# Patient Record
Sex: FEMALE | Race: WHITE | Employment: FULL TIME | URBAN - METROPOLITAN AREA
[De-identification: names, ages, dates, MRNs, and addresses within clinical notes are randomized per-mention and may not be internally consistent; named-entity substitution may affect disease eponyms.]

---

## 2017-01-01 ENCOUNTER — HOSPITAL ENCOUNTER (OUTPATIENT)
Dept: INFUSION THERAPY | Age: 57
Discharge: HOME | End: 2017-12-29
Payer: COMMERCIAL

## 2017-01-01 VITALS
SYSTOLIC BLOOD PRESSURE: 139 MMHG | DIASTOLIC BLOOD PRESSURE: 87 MMHG | TEMPERATURE: 97.8 F | HEART RATE: 79 BPM | RESPIRATION RATE: 16 BRPM

## 2017-01-01 LAB
ALBUMIN SERPL-MCNC: 3.9 G/DL (ref 3.5–5)
ALBUMIN/GLOB SERPL: 1.1 {RATIO} (ref 1.1–2.2)
ALP SERPL-CCNC: 99 U/L (ref 45–117)
ALT SERPL-CCNC: 40 U/L (ref 12–78)
ANION GAP SERPL CALC-SCNC: 7 MMOL/L (ref 5–15)
AST SERPL-CCNC: 31 U/L (ref 15–37)
BASOPHILS # BLD: 0 K/UL (ref 0–0.1)
BASOPHILS NFR BLD: 0 % (ref 0–1)
BILIRUB SERPL-MCNC: 0.3 MG/DL (ref 0.2–1)
BUN SERPL-MCNC: 14 MG/DL (ref 6–20)
BUN/CREAT SERPL: 25 (ref 12–20)
CALCIUM SERPL-MCNC: 9 MG/DL (ref 8.5–10.1)
CHLORIDE SERPL-SCNC: 104 MMOL/L (ref 97–108)
CO2 SERPL-SCNC: 29 MMOL/L (ref 21–32)
CREAT SERPL-MCNC: 0.55 MG/DL (ref 0.55–1.02)
EOSINOPHIL # BLD: 0 K/UL (ref 0–0.4)
EOSINOPHIL NFR BLD: 1 % (ref 0–7)
ERYTHROCYTE [DISTWIDTH] IN BLOOD BY AUTOMATED COUNT: 14 % (ref 11.5–14.5)
GLOBULIN SER CALC-MCNC: 3.5 G/DL (ref 2–4)
GLUCOSE SERPL-MCNC: 82 MG/DL (ref 65–100)
HCT VFR BLD AUTO: 37.7 % (ref 35–47)
HGB BLD-MCNC: 12.4 G/DL (ref 11.5–16)
LYMPHOCYTES # BLD: 0.8 K/UL (ref 0.8–3.5)
LYMPHOCYTES NFR BLD: 22 % (ref 12–49)
MCH RBC QN AUTO: 29.9 PG (ref 26–34)
MCHC RBC AUTO-ENTMCNC: 32.9 G/DL (ref 30–36.5)
MCV RBC AUTO: 90.8 FL (ref 80–99)
MONOCYTES # BLD: 0.5 K/UL (ref 0–1)
MONOCYTES NFR BLD: 14 % (ref 5–13)
NEUTS SEG # BLD: 2.4 K/UL (ref 1.8–8)
NEUTS SEG NFR BLD: 63 % (ref 32–75)
PLATELET # BLD AUTO: 135 K/UL (ref 150–400)
POTASSIUM SERPL-SCNC: 4.2 MMOL/L (ref 3.5–5.1)
PROT SERPL-MCNC: 7.4 G/DL (ref 6.4–8.2)
RBC # BLD AUTO: 4.15 M/UL (ref 3.8–5.2)
SODIUM SERPL-SCNC: 140 MMOL/L (ref 136–145)
WBC # BLD AUTO: 3.8 K/UL (ref 3.6–11)

## 2017-01-01 PROCEDURE — 96401 CHEMO ANTI-NEOPL SQ/IM: CPT

## 2017-01-01 PROCEDURE — 36415 COLL VENOUS BLD VENIPUNCTURE: CPT | Performed by: NURSE PRACTITIONER

## 2017-01-01 PROCEDURE — 85025 COMPLETE CBC W/AUTO DIFF WBC: CPT | Performed by: NURSE PRACTITIONER

## 2017-01-01 PROCEDURE — 80053 COMPREHEN METABOLIC PANEL: CPT | Performed by: NURSE PRACTITIONER

## 2017-01-01 PROCEDURE — 74011250636 HC RX REV CODE- 250/636: Performed by: NURSE PRACTITIONER

## 2017-01-01 RX ORDER — LANREOTIDE ACETATE 120 MG/.5ML
120 INJECTION SUBCUTANEOUS ONCE
Status: COMPLETED | OUTPATIENT
Start: 2017-01-01 | End: 2017-01-01

## 2017-01-01 RX ADMIN — LANREOTIDE ACETATE 120 MG: 120 INJECTION SUBCUTANEOUS at 13:33

## 2017-01-10 ENCOUNTER — TELEPHONE (OUTPATIENT)
Dept: ONCOLOGY | Age: 57
End: 2017-01-10

## 2017-01-11 NOTE — TELEPHONE ENCOUNTER
Jone needed to know which benefit to bill under, medical or pharmacy. I called Amy Caro with [employer] and she looked into this for me. It goes under Medical since it is in the infusion center. I informed Jone of this.

## 2017-01-13 ENCOUNTER — TELEPHONE (OUTPATIENT)
Dept: ONCOLOGY | Age: 57
End: 2017-01-13

## 2017-01-13 NOTE — TELEPHONE ENCOUNTER
Jacquie from [employer] would like know [employer] will be bought through the office or through a pharmacy.   [phone]

## 2017-01-13 NOTE — TELEPHONE ENCOUNTER
Meagan Painter, spoke with Layla Duval who is asking specific questions regarding the billing for triptap. Layla Duval was provided the number to the billing dept and has no further questions at this time.

## 2017-01-13 NOTE — TELEPHONE ENCOUNTER
Not sure purpose of this inquiry. Patient on Lanreotide (Somatuline Depot) injection given through infusion center. This is obtained from [id number] [place] [address] and given by nursing in infusion center monthly.

## 2017-01-18 ENCOUNTER — HOSPITAL ENCOUNTER (OUTPATIENT)
Dept: INFUSION THERAPY | Age: 57
Discharge: HOME | End: 2017-01-18
Payer: COMMERCIAL

## 2017-01-18 VITALS
HEART RATE: 80 BPM | RESPIRATION RATE: 18 BRPM | SYSTOLIC BLOOD PRESSURE: 139 MMHG | DIASTOLIC BLOOD PRESSURE: 76 MMHG | TEMPERATURE: 96.6 F

## 2017-01-18 LAB
ALBUMIN SERPL BCP-MCNC: 3.8 G/DL (ref 3.5–5)
ALBUMIN/GLOB SERPL: 1.1 {RATIO} (ref 1.1–2.2)
ALP SERPL-CCNC: 90 U/L (ref 45–117)
ALT SERPL-CCNC: 42 U/L (ref 12–78)
ANION GAP BLD CALC-SCNC: 10 MMOL/L (ref 5–15)
AST SERPL W P-5'-P-CCNC: 27 U/L (ref 15–37)
BASOPHILS # BLD AUTO: 0 K/UL (ref 0–0.1)
BASOPHILS # BLD: 0 % (ref 0–1)
BILIRUB SERPL-MCNC: 0.5 MG/DL (ref 0.2–1)
BUN SERPL-MCNC: 14 MG/DL (ref 6–20)
BUN/CREAT SERPL: 20 (ref 12–20)
CALCIUM SERPL-MCNC: 8.7 MG/DL (ref 8.5–10.1)
CHLORIDE SERPL-SCNC: 103 MMOL/L (ref 97–108)
CO2 SERPL-SCNC: 27 MMOL/L (ref 21–32)
CREAT SERPL-MCNC: 0.71 MG/DL (ref 0.55–1.02)
DIFFERENTIAL METHOD BLD: ABNORMAL
EOSINOPHIL # BLD: 0 K/UL (ref 0–0.4)
EOSINOPHIL NFR BLD: 1 % (ref 0–7)
ERYTHROCYTE [DISTWIDTH] IN BLOOD BY AUTOMATED COUNT: 15 % (ref 11.5–14.5)
GLOBULIN SER CALC-MCNC: 3.5 G/DL (ref 2–4)
GLUCOSE SERPL-MCNC: 169 MG/DL (ref 65–100)
HCT VFR BLD AUTO: 40.3 % (ref 35–47)
HGB BLD-MCNC: 13.8 G/DL (ref 11.5–16)
LYMPHOCYTES # BLD AUTO: 55 % (ref 12–49)
LYMPHOCYTES # BLD: 2.3 K/UL (ref 0.8–3.5)
MCH RBC QN AUTO: 30.1 PG (ref 26–34)
MCHC RBC AUTO-ENTMCNC: 34.2 G/DL (ref 30–36.5)
MCV RBC AUTO: 87.8 FL (ref 80–99)
MONOCYTES # BLD: 0.3 K/UL (ref 0–1)
MONOCYTES NFR BLD AUTO: 7 % (ref 5–13)
NEUTS SEG # BLD: 1.5 K/UL (ref 1.8–8)
NEUTS SEG NFR BLD AUTO: 37 % (ref 32–75)
PLATELET # BLD AUTO: 132 K/UL (ref 150–400)
POTASSIUM SERPL-SCNC: 3.8 MMOL/L (ref 3.5–5.1)
PROT SERPL-MCNC: 7.3 G/DL (ref 6.4–8.2)
RBC # BLD AUTO: 4.59 M/UL (ref 3.8–5.2)
RBC MORPH BLD: ABNORMAL
SODIUM SERPL-SCNC: 140 MMOL/L (ref 136–145)
WBC # BLD AUTO: 4.1 K/UL (ref 3.6–11)

## 2017-01-18 PROCEDURE — 36415 COLL VENOUS BLD VENIPUNCTURE: CPT

## 2017-01-18 PROCEDURE — 80053 COMPREHEN METABOLIC PANEL: CPT

## 2017-01-18 PROCEDURE — 85025 COMPLETE CBC W/AUTO DIFF WBC: CPT

## 2017-01-18 NOTE — PROGRESS NOTES
Problem: Patient Education: Go to Education Activity  Goal: Patient/Family Education  Outcome: Progressing Towards Goal  labs

## 2017-01-18 NOTE — PROGRESS NOTES
1305 Pt arrived ambualatory and in no distress for labs. Blood drawn peripherally without issue. 1320 Pt discharged with no complaints. Tolerated visit well. Patient Vitals for the past 12 hrs:   Temp Pulse Resp BP   01/18/17 1309 96.6 °F (35.9 °C) 80 18 139/76     Please see [facility] for results. Pt states that she is involved in clinical trial at [place] [address] in [place] and will fax a copy of the lab results from [facility] when they become available.

## 2017-01-19 ENCOUNTER — TELEPHONE (OUTPATIENT)
Dept: ONCOLOGY | Age: 57
End: 2017-01-19

## 2017-01-20 ENCOUNTER — TELEPHONE (OUTPATIENT)
Dept: ONCOLOGY | Age: 57
End: 2017-01-20

## 2017-01-20 NOTE — TELEPHONE ENCOUNTER
Voicemail: 1/20/17 @ 8:10am    Patient calling to speak to [facility] or Ricardo. Oni has been upgraded and the patient can not get to it. She needs the lab results to give to Hannah Stack. She would like to know if it can be emailed to her or if she can come  a copy. Please call the patient back with a plan at [phone].  Thanks

## 2017-01-26 ENCOUNTER — APPOINTMENT (OUTPATIENT)
Dept: INFUSION THERAPY | Age: 57
End: 2017-01-26
Payer: COMMERCIAL

## 2017-01-26 RX ORDER — LANREOTIDE ACETATE 120 MG/.5ML
120 INJECTION SUBCUTANEOUS ONCE
Status: COMPLETED | OUTPATIENT
Start: 2017-01-27 | End: 2017-01-27

## 2017-01-27 ENCOUNTER — HOSPITAL ENCOUNTER (OUTPATIENT)
Dept: INFUSION THERAPY | Age: 57
Discharge: HOME | End: 2017-01-27
Payer: COMMERCIAL

## 2017-01-27 ENCOUNTER — OFFICE VISIT (OUTPATIENT)
Dept: ONCOLOGY | Age: 57
End: 2017-01-27

## 2017-01-27 VITALS
BODY MASS INDEX: 28.63 KG/M2 | DIASTOLIC BLOOD PRESSURE: 80 MMHG | HEIGHT: 70 IN | OXYGEN SATURATION: 98 % | SYSTOLIC BLOOD PRESSURE: 121 MMHG | TEMPERATURE: 98.1 F | HEART RATE: 79 BPM | WEIGHT: 200 LBS | RESPIRATION RATE: 16 BRPM

## 2017-01-27 VITALS
RESPIRATION RATE: 18 BRPM | SYSTOLIC BLOOD PRESSURE: 135 MMHG | OXYGEN SATURATION: 100 % | TEMPERATURE: 97 F | HEART RATE: 76 BPM | DIASTOLIC BLOOD PRESSURE: 86 MMHG

## 2017-01-27 DIAGNOSIS — R06.09 DOE (DYSPNEA ON EXERTION): ICD-10-CM

## 2017-01-27 DIAGNOSIS — R19.7 DIARRHEA, UNSPECIFIED TYPE: ICD-10-CM

## 2017-01-27 DIAGNOSIS — C7A.8 NEUROENDOCRINE CARCINOMA OF SMALL BOWEL (HCC): Primary | ICD-10-CM

## 2017-01-27 PROCEDURE — 74011250636 HC RX REV CODE- 250/636: Performed by: NURSE PRACTITIONER

## 2017-01-27 PROCEDURE — 86316 IMMUNOASSAY TUMOR OTHER: CPT | Performed by: NURSE PRACTITIONER

## 2017-01-27 PROCEDURE — 36415 COLL VENOUS BLD VENIPUNCTURE: CPT | Performed by: NURSE PRACTITIONER

## 2017-01-27 PROCEDURE — 96372 THER/PROPH/DIAG INJ SC/IM: CPT

## 2017-01-27 RX ADMIN — LANREOTIDE ACETATE 120 MG: 120 INJECTION SUBCUTANEOUS at 13:25

## 2017-01-27 NOTE — PROGRESS NOTES
Hematology/Oncology Progress Note    REASON FOR VISIT: Neuroendocrine cancer    HISTORY OF PRESENT ILLNESS: Ms. Annamaria Garcia is a 49 y.o. 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 She had been having recurrent abdominal pain. She ultimately had a CT scan of her abdomen. While she had cholecystitis which was contributing to a lot of her symptoms she was found to have a mesenteric mass. She underwent cholecystectomy and resection of small bowel mass on 7/21/15. Intraoperatively it was noted that that there were tumor deposits in the mesentery and peritoneal surface. These deposits were not biopsied due to logistical reasons/access. She has seen Dr. Antonio Lombardi at [address] [place] [address] who recommended surgery. Saw [facility] who recommended observation, but confirmed metastatic disease using DOTATE PET scan. She also saw Dr. Francisco Washington in [place], [place]. The imaging she had in [place] and [facility] confirmed disease in her liver. (MRI in [place] on 10/5/15 revealed multiple hypervascular liver lesions compatible with liver metastases.)     Lanreotide was started on 1/29/16. Presents for follow-up while on Lanreotide injection. Started sunitinib in the context of a clinical trial at [place] [address] on 12/11/16. Feeling well overall. Some lab changes that she has been following. No fever or chills. No change in appetite. Diarrhea at baseline. Denies abdominal pain. Does feel that flushing is increased. Having multiple times per day now. No nausea or vomiting. Appetite has been stable. Energy is good, has been busy traveling for work. Has noticed increased shortness of breath with walking up stairs and running. Takes her a few seconds to catch her breath with certain activities. This has been more noticeable in the last few days. Otherwise, ROS is per the symptom report form which has been scanned into the media section of the electronic medical record.     Past Medical History   Diagnosis Date    Abdominal mass 7/2015    Carcinoid tumor     Gallstones     Hypertension     Neuroendocrine carcinoma of small bowel ([address] 8/3/2015       Past Surgical History   Procedure Laterality Date    Hx heent       LASIK    Hx cataract removal Bilateral     Hx gyn       vaginal DELIVERIES  x2    Hx colonoscopy  AUG 2012    Hx orthopaedic Left 1979     RELOCATION [facility]    Hx abdominal laparoscopy  7/2015     Carcinoid tumor       No Known Allergies    Current Outpatient Prescriptions   Medication Sig Dispense Refill    VIT C/E/ZN/COPPR/LUTEIN/ZEAXAN (PRESERVISION AREDS 2 PO) Take  by mouth.  SUNItinib 37.5 mg cap Take  by mouth daily.  diphenoxylate-atropine (LOMOTIL) 2.5-0.025 mg per tablet TAKE 1 TO 2 TABLETS BY MOUTH 4 TIMES DAILY AS NEEDED FOR DIARHEA ( MAX OF 8 TABS) 30 Tab 1    lanreotide (SOMATULINE DEPOT) 120 mg/0.5 mL injection syringe 120 mg by SubCUTAneous route every twenty-eight (28) days.  MULTIVITAMIN PO Take  by mouth daily.  lisinopril-hydrochlorothiazide (PRINZIDE, ZESTORETIC) 20-12.5 mg per tablet Take 1 Tab by mouth daily. Social History     Social History    Marital status:      Spouse name: N/A    Number of children: N/A    Years of education: N/A     Social History Main Topics    Smoking status: Never Smoker    Smokeless tobacco: Never Used    Alcohol use 0.6 oz/week     1 Glasses of wine per week      Comment: RARE WINE    Drug use: No    Sexual activity: Yes     Partners: Male     Other Topics Concern    None     Social History Narrative       Family History   Problem Relation Age of Onset   [employer] Cancer Mother      unsure which type    Cancer Maternal Grandfather     Anesth Problems Neg Hx        ROS  As per the HPI, otherwise a comprehensive ROS is negative. ECOG PS is 0. Emotional well being addressed and patient is coping well.     Physical Examination:   Visit Vitals    /80    Pulse 79    Temp 98.1 °F (36.7 °C) (Oral)    Resp 16    Ht 5' 10\" (1.778 m)    Wt 200 lb (90.7 kg)    SpO2 98%    BMI 28.7 kg/m2     General appearance - alert, well appearing, and in no distress  Mental status - oriented to person, place, and time  Mouth - mucous membranes moist, pharynx normal without lesions  Neck - supple  Lymphatics - no palpable lymphadenopathy  Chest - clear to auscultation, no wheezes, rales or rhonchi, symmetric air entry  Heart - normal rate, regular rhythm, normal S1, S2, no murmurs, rubs, clicks or gallops  Abdomen -soft, nontender, nondistended,bowel sounds present  Neurological - normal speech, no focal findings or movement disorder noted  Musculoskeletal - no joint tenderness, deformity or swelling, equal strength in upper and lower extremities  Extremities - peripheral pulses normal, no pedal edema, no clubbing or cyanosis  Skin - normal coloration and turgor, no rashes, no suspicious skin lesions noted    LABS  Lab Results   Component Value Date/Time    WBC 4.1 01/18/2017 01:14 PM    HGB 13.8 01/18/2017 01:14 PM    HCT 40.3 01/18/2017 01:14 PM    PLATELET 304 12/74/7348 01:14 PM    MCV 87.8 01/18/2017 01:14 PM    ABS. NEUTROPHILS 1.5 01/18/2017 01:14 PM     Lab Results   Component Value Date/Time    Sodium 140 01/18/2017 01:14 PM    Potassium 3.8 01/18/2017 01:14 PM    Chloride 103 01/18/2017 01:14 PM    CO2 27 01/18/2017 01:14 PM    Glucose 169 01/18/2017 01:14 PM    BUN 14 01/18/2017 01:14 PM    Creatinine 0.71 01/18/2017 01:14 PM    GFR est AA >60 01/18/2017 01:14 PM    GFR est non-AA >60 01/18/2017 01:14 PM    Calcium 8.7 01/18/2017 01:14 PM     Lab Results   Component Value Date/Time    AST 27 01/18/2017 01:14 PM    Alk.  phosphatase 90 01/18/2017 01:14 PM    Protein, total 7.3 01/18/2017 01:14 PM    Albumin 3.8 01/18/2017 01:14 PM    Globulin 3.5 01/18/2017 01:14 PM    A-G Ratio 1.1 01/18/2017 01:14 PM       PATHOLOGY  Small bowel resection on 7/21/15 ([id number]) with a 2.5cm well differentiated neuroendocrine cancer (carcinoid tumor) with 4 of 8 nodes positive. Margins negative. Pathologic stage T3N1. On IHC the cells were positive for synaptophysin, chromogranin, focally positive for pancytokeratin and CK 20. IMAGING  MRI abd 7/22/16: Most liver lesions demonstrate slight increase in size compared to prior exam.    MRI abdomen on 4/11/16 with increased size of hepatic metastases. CT abdomen/pelvis on 4/11/16 with small mesenteric lymph nodes/masses and peritoneal nodules. Hepatic metastases. MRI pelvis on 12/29/15 with few scattered, nonenlarged lymph nodes seen in the right lower quadrant adjacent to the appendix, unchanged from prior CT. No specific evidence of metastatic disease in the pelvis. MRI abdomen 12/29/15 shows that SMV thrombosis is gone. Interval increase in size in numerous new metastatic liver lesions described above. At least 2 new lesions are seen. Octreotide scan on 8/11/15 with no areas of uptake. CT chest on 8/12/15 with no evidence of metastatic disease in the chest.   CT abdomen on 7/2/15 with stellate shaped mass seen in the right lower quadrant in the mesentery series 2 image 56 measuring 2.7 x   1.5 x 1.7 cm. There are fingerlike projections extending from this lesion to the small bowel which appears to be tethered. Questionable wall thickening seen in the loop of small bowel in the right lower quadrant. Multiple small nodules/lymph nodes seen in the mesentery, many of which are surrounding the stellate shaped mass. ASSESSMENT  Ms. So Price is a 49 y.o. 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 DISCUSSION/PLAN  1. Neuroendocrine cancer. Doing well on Lanrotide. On Sutent in the setting of a clinical trial at [place] [address]. Some neutropenia and thrombocytopenia. Repeat CBC pending today. Grade 2 neutropenia, grade 1 thrombocytopenia. May require dose reduction. Currently on 37.5mg dose. The plan for next imaging at [place] [address] is in March 2017. Continue same therapy for now.       2. Superior Mesenteric Thrombosis. Continue aspirin. 3. Diarrhea. Continue Lomotil as prescribed. No refills needed today. 4. HDEZ. With walking up stairs and running. May be normal, but recommended repeat ECHO as there is some decreased EF associated with Sutent. She will ask study nurse about having done on Monday when there for repeat labs. Follow up in 1 month. Seen in conjunction with Carlos Yee NP.     Roberta Chung MD

## 2017-01-31 LAB — CGA SERPL-SCNC: 46 NMOL/L (ref 0–5)

## 2017-02-16 ENCOUNTER — TELEPHONE (OUTPATIENT)
Dept: ONCOLOGY | Age: 57
End: 2017-02-16

## 2017-02-16 DIAGNOSIS — R06.02 SHORTNESS OF BREATH: ICD-10-CM

## 2017-02-16 DIAGNOSIS — C7A.8 NEUROENDOCRINE CARCINOMA OF SMALL BOWEL (HCC): Primary | ICD-10-CM

## 2017-02-16 NOTE — TELEPHONE ENCOUNTER
Scheduled pt for echo for 1:30 at [place] [address] on 2/22/17. Lvm for pt to call back to confirm.

## 2017-02-23 ENCOUNTER — APPOINTMENT (OUTPATIENT)
Dept: INFUSION THERAPY | Age: 57
End: 2017-02-23
Payer: COMMERCIAL

## 2017-02-24 ENCOUNTER — HOSPITAL ENCOUNTER (OUTPATIENT)
Dept: NON INVASIVE DIAGNOSTICS | Age: 57
Discharge: HOME | End: 2017-02-24
Attending: NURSE PRACTITIONER
Payer: COMMERCIAL

## 2017-02-24 ENCOUNTER — HOSPITAL ENCOUNTER (OUTPATIENT)
Dept: INFUSION THERAPY | Age: 57
Discharge: HOME | End: 2017-02-24
Payer: COMMERCIAL

## 2017-02-24 VITALS
TEMPERATURE: 98 F | SYSTOLIC BLOOD PRESSURE: 131 MMHG | RESPIRATION RATE: 18 BRPM | DIASTOLIC BLOOD PRESSURE: 83 MMHG | HEART RATE: 80 BPM

## 2017-02-24 DIAGNOSIS — R06.02 SHORTNESS OF BREATH: ICD-10-CM

## 2017-02-24 DIAGNOSIS — C7A.8 NEUROENDOCRINE CARCINOMA OF SMALL BOWEL (HCC): ICD-10-CM

## 2017-02-24 LAB
ALBUMIN SERPL BCP-MCNC: 3.8 G/DL (ref 3.5–5)
ALBUMIN/GLOB SERPL: 1.2 {RATIO} (ref 1.1–2.2)
ALP SERPL-CCNC: 90 U/L (ref 45–117)
ALT SERPL-CCNC: 49 U/L (ref 12–78)
ANION GAP BLD CALC-SCNC: 6 MMOL/L (ref 5–15)
AST SERPL W P-5'-P-CCNC: 26 U/L (ref 15–37)
BASOPHILS # BLD AUTO: 0 K/UL (ref 0–0.1)
BASOPHILS # BLD: 0 % (ref 0–1)
BILIRUB SERPL-MCNC: 0.5 MG/DL (ref 0.2–1)
BUN SERPL-MCNC: 18 MG/DL (ref 6–20)
BUN/CREAT SERPL: 23 (ref 12–20)
CALCIUM SERPL-MCNC: 8.8 MG/DL (ref 8.5–10.1)
CHLORIDE SERPL-SCNC: 102 MMOL/L (ref 97–108)
CO2 SERPL-SCNC: 29 MMOL/L (ref 21–32)
CREAT SERPL-MCNC: 0.8 MG/DL (ref 0.55–1.02)
EOSINOPHIL # BLD: 0 K/UL (ref 0–0.4)
EOSINOPHIL NFR BLD: 1 % (ref 0–7)
ERYTHROCYTE [DISTWIDTH] IN BLOOD BY AUTOMATED COUNT: 16 % (ref 11.5–14.5)
GLOBULIN SER CALC-MCNC: 3.3 G/DL (ref 2–4)
GLUCOSE SERPL-MCNC: 148 MG/DL (ref 65–100)
HCT VFR BLD AUTO: 39.7 % (ref 35–47)
HGB BLD-MCNC: 13.5 G/DL (ref 11.5–16)
LYMPHOCYTES # BLD AUTO: 50 % (ref 12–49)
LYMPHOCYTES # BLD: 1.7 K/UL (ref 0.8–3.5)
MCH RBC QN AUTO: 31 PG (ref 26–34)
MCHC RBC AUTO-ENTMCNC: 34 G/DL (ref 30–36.5)
MCV RBC AUTO: 91.1 FL (ref 80–99)
MONOCYTES # BLD: 0.3 K/UL (ref 0–1)
MONOCYTES NFR BLD AUTO: 8 % (ref 5–13)
NEUTS SEG # BLD: 1.4 K/UL (ref 1.8–8)
NEUTS SEG NFR BLD AUTO: 41 % (ref 32–75)
PLATELET # BLD AUTO: 134 K/UL (ref 150–400)
POTASSIUM SERPL-SCNC: 4.4 MMOL/L (ref 3.5–5.1)
PROT SERPL-MCNC: 7.1 G/DL (ref 6.4–8.2)
RBC # BLD AUTO: 4.36 M/UL (ref 3.8–5.2)
SODIUM SERPL-SCNC: 137 MMOL/L (ref 136–145)
WBC # BLD AUTO: 3.4 K/UL (ref 3.6–11)

## 2017-02-24 PROCEDURE — 86316 IMMUNOASSAY TUMOR OTHER: CPT

## 2017-02-24 PROCEDURE — 74011250636 HC RX REV CODE- 250/636: Performed by: NURSE PRACTITIONER

## 2017-02-24 PROCEDURE — 80053 COMPREHEN METABOLIC PANEL: CPT

## 2017-02-24 PROCEDURE — 93306 TTE W/DOPPLER COMPLETE: CPT

## 2017-02-24 PROCEDURE — 96402 CHEMO HORMON ANTINEOPL SQ/IM: CPT

## 2017-02-24 PROCEDURE — 36415 COLL VENOUS BLD VENIPUNCTURE: CPT

## 2017-02-24 PROCEDURE — 85025 COMPLETE CBC W/AUTO DIFF WBC: CPT

## 2017-02-24 RX ORDER — LANREOTIDE ACETATE 120 MG/.5ML
120 INJECTION SUBCUTANEOUS ONCE
Status: COMPLETED | OUTPATIENT
Start: 2017-02-24 | End: 2017-02-24

## 2017-02-24 RX ADMIN — LANREOTIDE ACETATE 120 MG: 120 INJECTION SUBCUTANEOUS at 12:55

## 2017-02-24 NOTE — PROGRESS NOTES
Outpatient Infusion Center Short Visit Progress Note    1300 Pt admit to [facility] for lanreotide and labs ambulatory in stable condition. Assessment completed. No new concerns voiced. Visit Vitals    /83 (BP 1 Location: Right arm, BP Patient Position: Sitting)    Pulse 80    Temp 98 °F (36.7 °C)    Resp 18    Breastfeeding No     Medications:  Lanreotide (right gluteus eris)    1300 Pt tolerated treatment well. D/c home ambulatory in no distress. Pt aware of next appointment scheduled for 3/23/17 at 0900. Recent Results (from the past 12 hour(s))   CBC WITH AUTOMATED DIFF    Collection Time: 02/24/17 12:39 PM   Result Value Ref Range    WBC 3.4 (L) 3.6 - 11.0 K/uL    RBC 4.36 3.80 - 5.20 M/uL    HGB 13.5 11.5 - 16.0 g/dL    HCT 39.7 35.0 - 47.0 %    MCV 91.1 80.0 - 99.0 FL    MCH 31.0 26.0 - 34.0 PG    MCHC 34.0 30.0 - 36.5 g/dL    RDW 16.0 (H) 11.5 - 14.5 %    PLATELET 282 (L) 090 - 400 K/uL    NEUTROPHILS 41 32 - 75 %    LYMPHOCYTES 50 (H) 12 - 49 %    MONOCYTES 8 5 - 13 %    EOSINOPHILS 1 0 - 7 %    BASOPHILS 0 0 - 1 %    ABS. NEUTROPHILS 1.4 (L) 1.8 - 8.0 K/UL    ABS. LYMPHOCYTES 1.7 0.8 - 3.5 K/UL    ABS. MONOCYTES 0.3 0.0 - 1.0 K/UL    ABS. EOSINOPHILS 0.0 0.0 - 0.4 K/UL    ABS. BASOPHILS 0.0 0.0 - 0.1 K/UL   METABOLIC PANEL, COMPREHENSIVE    Collection Time: 02/24/17 12:39 PM   Result Value Ref Range    Sodium 137 136 - 145 mmol/L    Potassium 4.4 3.5 - 5.1 mmol/L    Chloride 102 97 - 108 mmol/L    CO2 29 21 - 32 mmol/L    Anion gap 6 5 - 15 mmol/L    Glucose 148 (H) 65 - 100 mg/dL    BUN 18 6 - 20 MG/DL    Creatinine 0.80 0.55 - 1.02 MG/DL    BUN/Creatinine ratio 23 (H) 12 - 20      GFR est AA >60 >60 ml/min/1.73m2    GFR est non-AA >60 >60 ml/min/1.73m2    Calcium 8.8 8.5 - 10.1 MG/DL    Bilirubin, total 0.5 0.2 - 1.0 MG/DL    ALT (SGPT) 49 12 - 78 U/L    AST (SGOT) 26 15 - 37 U/L    Alk.  phosphatase 90 45 - 117 U/L    Protein, total 7.1 6.4 - 8.2 g/dL    Albumin 3.8 3.5 - 5.0 g/dL Globulin 3.3 2.0 - 4.0 g/dL    A-G Ratio 1.2 1.1 - 2.2

## 2017-02-28 LAB — CGA SERPL-SCNC: 58 NMOL/L (ref 0–5)

## 2017-03-01 ENCOUNTER — HOSPITAL ENCOUNTER (OUTPATIENT)
Dept: MAMMOGRAPHY | Age: 57
Discharge: HOME | End: 2017-03-01

## 2017-03-01 DIAGNOSIS — Z12.31 VISIT FOR SCREENING MAMMOGRAM: ICD-10-CM

## 2017-03-01 PROCEDURE — 77067 SCR MAMMO BI INCL CAD: CPT

## 2017-03-21 RX ORDER — LANREOTIDE ACETATE 120 MG/.5ML
120 INJECTION SUBCUTANEOUS ONCE
Status: COMPLETED | OUTPATIENT
Start: 2017-03-23 | End: 2017-03-23

## 2017-03-23 ENCOUNTER — HOSPITAL ENCOUNTER (OUTPATIENT)
Dept: INFUSION THERAPY | Age: 57
Discharge: HOME | End: 2017-03-23
Payer: COMMERCIAL

## 2017-03-23 ENCOUNTER — OFFICE VISIT (OUTPATIENT)
Dept: ONCOLOGY | Age: 57
End: 2017-03-23

## 2017-03-23 VITALS
TEMPERATURE: 97.7 F | SYSTOLIC BLOOD PRESSURE: 132 MMHG | HEIGHT: 70 IN | RESPIRATION RATE: 18 BRPM | OXYGEN SATURATION: 100 % | WEIGHT: 189.8 LBS | HEART RATE: 71 BPM | DIASTOLIC BLOOD PRESSURE: 85 MMHG | BODY MASS INDEX: 27.17 KG/M2

## 2017-03-23 VITALS
RESPIRATION RATE: 18 BRPM | SYSTOLIC BLOOD PRESSURE: 141 MMHG | DIASTOLIC BLOOD PRESSURE: 85 MMHG | HEART RATE: 65 BPM | TEMPERATURE: 97 F | OXYGEN SATURATION: 99 %

## 2017-03-23 DIAGNOSIS — R19.7 DIARRHEA, UNSPECIFIED TYPE: ICD-10-CM

## 2017-03-23 DIAGNOSIS — R63.4 WEIGHT LOSS, UNINTENTIONAL: ICD-10-CM

## 2017-03-23 DIAGNOSIS — R06.09 DOE (DYSPNEA ON EXERTION): ICD-10-CM

## 2017-03-23 DIAGNOSIS — C7A.8 NEUROENDOCRINE CARCINOMA OF SMALL BOWEL (HCC): Primary | ICD-10-CM

## 2017-03-23 LAB
ALBUMIN SERPL BCP-MCNC: 4.1 G/DL (ref 3.5–5)
ALBUMIN/GLOB SERPL: 1.1 {RATIO} (ref 1.1–2.2)
ALP SERPL-CCNC: 87 U/L (ref 45–117)
ALT SERPL-CCNC: 58 U/L (ref 12–78)
ANION GAP BLD CALC-SCNC: 7 MMOL/L (ref 5–15)
AST SERPL W P-5'-P-CCNC: 30 U/L (ref 15–37)
BASOPHILS # BLD AUTO: 0 K/UL (ref 0–0.1)
BASOPHILS # BLD: 0 % (ref 0–1)
BILIRUB SERPL-MCNC: 0.6 MG/DL (ref 0.2–1)
BUN SERPL-MCNC: 17 MG/DL (ref 6–20)
BUN/CREAT SERPL: 23 (ref 12–20)
CALCIUM SERPL-MCNC: 9.6 MG/DL (ref 8.5–10.1)
CHLORIDE SERPL-SCNC: 103 MMOL/L (ref 97–108)
CO2 SERPL-SCNC: 30 MMOL/L (ref 21–32)
CREAT SERPL-MCNC: 0.74 MG/DL (ref 0.55–1.02)
EOSINOPHIL # BLD: 0 K/UL (ref 0–0.4)
EOSINOPHIL NFR BLD: 1 % (ref 0–7)
ERYTHROCYTE [DISTWIDTH] IN BLOOD BY AUTOMATED COUNT: 15.1 % (ref 11.5–14.5)
GLOBULIN SER CALC-MCNC: 3.9 G/DL (ref 2–4)
GLUCOSE SERPL-MCNC: 104 MG/DL (ref 65–100)
HCT VFR BLD AUTO: 42.1 % (ref 35–47)
HGB BLD-MCNC: 14.4 G/DL (ref 11.5–16)
LYMPHOCYTES # BLD AUTO: 49 % (ref 12–49)
LYMPHOCYTES # BLD: 1.4 K/UL (ref 0.8–3.5)
MCH RBC QN AUTO: 32.4 PG (ref 26–34)
MCHC RBC AUTO-ENTMCNC: 34.2 G/DL (ref 30–36.5)
MCV RBC AUTO: 94.6 FL (ref 80–99)
MONOCYTES # BLD: 0.2 K/UL (ref 0–1)
MONOCYTES NFR BLD AUTO: 8 % (ref 5–13)
NEUTS SEG # BLD: 1.2 K/UL (ref 1.8–8)
NEUTS SEG NFR BLD AUTO: 42 % (ref 32–75)
PLATELET # BLD AUTO: 152 K/UL (ref 150–400)
POTASSIUM SERPL-SCNC: 3.8 MMOL/L (ref 3.5–5.1)
PROT SERPL-MCNC: 8 G/DL (ref 6.4–8.2)
RBC # BLD AUTO: 4.45 M/UL (ref 3.8–5.2)
SODIUM SERPL-SCNC: 140 MMOL/L (ref 136–145)
WBC # BLD AUTO: 2.9 K/UL (ref 3.6–11)

## 2017-03-23 PROCEDURE — 80053 COMPREHEN METABOLIC PANEL: CPT

## 2017-03-23 PROCEDURE — 86316 IMMUNOASSAY TUMOR OTHER: CPT

## 2017-03-23 PROCEDURE — 85025 COMPLETE CBC W/AUTO DIFF WBC: CPT

## 2017-03-23 PROCEDURE — 36415 COLL VENOUS BLD VENIPUNCTURE: CPT

## 2017-03-23 PROCEDURE — 74011250636 HC RX REV CODE- 250/636: Performed by: NURSE PRACTITIONER

## 2017-03-23 PROCEDURE — 96402 CHEMO HORMON ANTINEOPL SQ/IM: CPT

## 2017-03-23 RX ADMIN — LANREOTIDE ACETATE 120 MG: 120 INJECTION SUBCUTANEOUS at 09:47

## 2017-03-23 NOTE — PROGRESS NOTES
Hematology/Oncology Progress Note    REASON FOR VISIT: Neuroendocrine cancer    HISTORY OF PRESENT ILLNESS: Ms. Matty Sands is a 56 y.o. 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 She had been having recurrent abdominal pain. She ultimately had a CT scan of her abdomen. While she had cholecystitis which was contributing to a lot of her symptoms she was found to have a mesenteric mass. She underwent cholecystectomy and resection of small bowel mass on 7/21/15. Intraoperatively it was noted that that there were tumor deposits in the mesentery and peritoneal surface. These deposits were not biopsied due to logistical reasons/access. She has seen Dr. Iveth Lieberman at [address] [place] [address] who recommended surgery. Saw [facility] who recommended observation, but confirmed metastatic disease using DOTATE PET scan. She also saw Dr. Kelsey Lucero in [place], [place]. The imaging she had in [place] and [facility] confirmed disease in her liver. (MRI in [place] on 10/5/15 revealed multiple hypervascular liver lesions compatible with liver metastases.)     Lanreotide was started on 1/29/16. Presents for follow-up while on Lanreotide injection. Started sunitinib in the context of a clinical trial at [place] [address] on 12/11/16. Had scans last month that were stable. Diarrhea continues. SOB is stable. Has had some weight loss. Feels that she is eating smaller meals throughout the day. Getting full quicker. She is also remaining active. Just ran another half marathon. No nausea or vomiting. No new aches or pains. No mouth sores. Otherwise, ROS is per the symptom report form which has been scanned into the media section of the electronic medical record.     Past Medical History:   Diagnosis Date    Abdominal mass 7/2015    Carcinoid tumor     Gallstones     Hypertension     Neuroendocrine carcinoma of small bowel ([facility] [address] 8/3/2015       Past Surgical History:   Procedure Laterality Date    HX ABDOMINAL LAPAROSCOPY  7/2015    Carcinoid tumor    HX CATARACT REMOVAL Bilateral     HX COLONOSCOPY  AUG 2012    HX GYN      vaginal DELIVERIES  x2    HX HEENT      LASIK    HX ORTHOPAEDIC Left 1979    RELOCATION [facility]       No Known Allergies    Current Outpatient Prescriptions   Medication Sig Dispense Refill    VIT C/E/ZN/COPPR/LUTEIN/ZEAXAN (PRESERVISION AREDS 2 PO) Take  by mouth.  SUNItinib 37.5 mg cap Take  by mouth daily.  diphenoxylate-atropine (LOMOTIL) 2.5-0.025 mg per tablet TAKE 1 TO 2 TABLETS BY MOUTH 4 TIMES DAILY AS NEEDED FOR DIARHEA ( MAX OF 8 TABS) 30 Tab 1    lanreotide (SOMATULINE DEPOT) 120 mg/0.5 mL injection syringe 120 mg by SubCUTAneous route every twenty-eight (28) days.  MULTIVITAMIN PO Take  by mouth daily.  lisinopril-hydrochlorothiazide (PRINZIDE, ZESTORETIC) 20-12.5 mg per tablet Take 1 Tab by mouth daily.  FLUVIRIN [id number] susp injection ADM 0.5ML IM UTD  0       Social History     Social History    Marital status:      Spouse name: N/A    Number of children: N/A    Years of education: N/A     Social History Main Topics    Smoking status: Never Smoker    Smokeless tobacco: Never Used    Alcohol use 0.6 oz/week     1 Glasses of wine per week      Comment: RARE WINE    Drug use: No    Sexual activity: Yes     Partners: Male     Other Topics Concern    None     Social History Narrative       Family History   Problem Relation Age of Onset   [employer] Cancer Mother      unsure which type    Cancer Maternal Grandfather     Anesth Problems Neg Hx        ROS  As per the HPI, otherwise a comprehensive ROS is negative. ECOG PS is 0. Emotional well being addressed and patient is coping well.     Physical Examination:   Visit Vitals    /85 (BP 1 Location: Right arm, BP Patient Position: Sitting)    Pulse 71    Temp 97.7 °F (36.5 °C) (Oral)    Resp 18    Ht 5' 10\" (1.778 m)    Wt 189 lb 12.8 oz (86.1 kg)    SpO2 100%    BMI 27.23 kg/m2 General appearance - alert, well appearing, and in no distress  Mental status - oriented to person, place, and time  Mouth - mucous membranes moist, pharynx normal without lesions  Neck - supple  Lymphatics - no palpable lymphadenopathy  Chest - clear to auscultation, no wheezes, rales or rhonchi, symmetric air entry  Heart - normal rate, regular rhythm, normal S1, S2, no murmurs, rubs, clicks or gallops  Abdomen -soft, nontender, nondistended,bowel sounds present  Neurological - normal speech, no focal findings or movement disorder noted  Musculoskeletal - no joint tenderness, deformity or swelling, equal strength in upper and lower extremities  Extremities - peripheral pulses normal, no pedal edema, no clubbing or cyanosis  Skin - normal coloration and turgor, no rashes, no suspicious skin lesions noted    LABS  Lab Results   Component Value Date/Time    WBC 2.9 03/23/2017 08:37 AM    HGB 14.4 03/23/2017 08:37 AM    HCT 42.1 03/23/2017 08:37 AM    PLATELET 699 48/99/9864 08:37 AM    MCV 94.6 03/23/2017 08:37 AM    ABS. NEUTROPHILS 1.2 03/23/2017 08:37 AM     Lab Results   Component Value Date/Time    Sodium 140 03/23/2017 08:37 AM    Potassium 3.8 03/23/2017 08:37 AM    Chloride 103 03/23/2017 08:37 AM    CO2 30 03/23/2017 08:37 AM    Glucose 104 03/23/2017 08:37 AM    BUN 17 03/23/2017 08:37 AM    Creatinine 0.74 03/23/2017 08:37 AM    GFR est AA >60 03/23/2017 08:37 AM    GFR est non-AA >60 03/23/2017 08:37 AM    Calcium 9.6 03/23/2017 08:37 AM     Lab Results   Component Value Date/Time    AST (SGOT) 30 03/23/2017 08:37 AM    Alk. phosphatase 87 03/23/2017 08:37 AM    Protein, total 8.0 03/23/2017 08:37 AM    Albumin 4.1 03/23/2017 08:37 AM    Globulin 3.9 03/23/2017 08:37 AM    A-G Ratio 1.1 03/23/2017 08:37 AM       PATHOLOGY  Small bowel resection on 7/21/15 ([id number]) with a 2.5cm well differentiated neuroendocrine cancer (carcinoid tumor) with 4 of 8 nodes positive. Margins negative.   Pathologic stage T3N1. On IHC the cells were positive for synaptophysin, chromogranin, focally positive for pancytokeratin and CK 20. IMAGING  MRI abd 7/22/16: Most liver lesions demonstrate slight increase in size compared to prior exam.    MRI abdomen on 4/11/16 with increased size of hepatic metastases. CT abdomen/pelvis on 4/11/16 with small mesenteric lymph nodes/masses and peritoneal nodules. Hepatic metastases. MRI pelvis on 12/29/15 with few scattered, nonenlarged lymph nodes seen in the right lower quadrant adjacent to the appendix, unchanged from prior CT. No specific evidence of metastatic disease in the pelvis. MRI abdomen 12/29/15 shows that SMV thrombosis is gone. Interval increase in size in numerous new metastatic liver lesions described above. At least 2 new lesions are seen. Octreotide scan on 8/11/15 with no areas of uptake. CT chest on 8/12/15 with no evidence of metastatic disease in the chest.   CT abdomen on 7/2/15 with stellate shaped mass seen in the right lower quadrant in the mesentery series 2 image 56 measuring 2.7 x   1.5 x 1.7 cm. There are fingerlike projections extending from this lesion to the small bowel which appears to be tethered. Questionable wall thickening seen in the loop of small bowel in the right lower quadrant. Multiple small nodules/lymph nodes seen in the mesentery, many of which are surrounding the stellate shaped mass. ASSESSMENT  Ms. Flor Luke is a 56 y.o. 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 DISCUSSION/PLAN  1. Neuroendocrine cancer. Doing well on Lanrotide. On Sutent in the setting of a clinical trial at [place] [address]. Labs are overall stable. Tumor marker (chromogranin A) is trending up although this does not change overall plan in setting of stable scans per [facility]. Need to request these records. Discussed initiation of Xermelo in place of Lanreotide.   We reviewed the risks/benefits of this medication and I provided her with the manuscript of the study which lead to the drugs approval.  So we could review toxicity in more detail. 2. Superior Mesenteric Thrombosis. Continue aspirin. 3. Diarrhea. Continue Lomotil as prescribed. No refills needed today. 4. HDEZ. ECHO stable. May be side effect of Sutent. Follow up in 1 month. Seen in conjunction with Nii Khan NP.     Christina Castillo MD

## 2017-03-23 NOTE — PROGRESS NOTES
Outpatient Infusion Center Short Visit Progress Note    0830 Pt admit to [facility] for monthly labs and Lanreotide injection ambulatory in stable condition. Assessment completed. No new concerns voiced. Please review pending lab results in [facility]. Visit Vitals    /85 (BP 1 Location: Right arm, BP Patient Position: Sitting)    Pulse 65    Temp 97 °F (36.1 °C)    Resp 18    SpO2 99%    Breastfeeding No       Labs drawn peripherally from left [facility] and sent for processing. Medications:  Lanreotide deep SQ to left gluteus    0950 Pt tolerated treatment well. D/c home ambulatory in no distress. Pt aware of next appointment scheduled for 4/21/17 at 1:00 for monthly labs and Lanreotide injection.

## 2017-03-24 ENCOUNTER — APPOINTMENT (OUTPATIENT)
Dept: INFUSION THERAPY | Age: 57
End: 2017-03-24
Payer: COMMERCIAL

## 2017-03-28 LAB — CGA SERPL-SCNC: 66 NMOL/L (ref 0–5)

## 2017-03-29 ENCOUNTER — TELEPHONE (OUTPATIENT)
Dept: ONCOLOGY | Age: 57
End: 2017-03-29

## 2017-03-29 DIAGNOSIS — R19.7 DIARRHEA, UNSPECIFIED TYPE: ICD-10-CM

## 2017-03-29 RX ORDER — DIPHENOXYLATE HYDROCHLORIDE AND ATROPINE SULFATE 2.5; .025 MG/1; MG/1
TABLET ORAL
Qty: 30 TAB | Refills: 1 | Status: SHIPPED | OUTPATIENT
Start: 2017-03-29 | End: 2017-04-21

## 2017-04-03 ENCOUNTER — TELEPHONE (OUTPATIENT)
Dept: ONCOLOGY | Age: 57
End: 2017-04-03

## 2017-04-03 NOTE — TELEPHONE ENCOUNTER
SALAS cuenca. Received a call from Daniel Londono with [facility] pharmacy stating the script for the Emma Ponto was forwarded to them from [facility] specialty pharmacy and this is not a specialty med. [facility] is requesting a verbal authorization to fill order. Daniel Londono with [facility] states you can call [phone] and use reference # [id number] to give verbal authorization.

## 2017-04-04 NOTE — TELEPHONE ENCOUNTER
Spoke to [employer], verified script for pharmacy. If no issue with insurance they plan to ship out med today. Prior auth through insurance completed last week Friday.

## 2017-04-05 NOTE — TELEPHONE ENCOUNTER
Voicemail: 3/31/17 @ 3:21pm    Trung Downs calling on behalf of Renée Carpenter. The scrip is invalid as it was sent to the speciality pharmacy. Please call back and give a verbal at [phone] with reference number [id number].

## 2017-04-06 ENCOUNTER — TELEPHONE (OUTPATIENT)
Dept: ONCOLOGY | Age: 57
End: 2017-04-06

## 2017-04-06 NOTE — TELEPHONE ENCOUNTER
Per [employer] has LM for patient to return call to schedule delivery of Xermelo. [employer] will follow up to assure delivery.     [employer] number [phone]

## 2017-04-19 RX ORDER — LANREOTIDE ACETATE 120 MG/.5ML
120 INJECTION SUBCUTANEOUS ONCE
Status: COMPLETED | OUTPATIENT
Start: 2017-04-21 | End: 2017-04-21

## 2017-04-21 ENCOUNTER — HOSPITAL ENCOUNTER (OUTPATIENT)
Dept: INFUSION THERAPY | Age: 57
Discharge: HOME | End: 2017-04-21
Payer: COMMERCIAL

## 2017-04-21 ENCOUNTER — OFFICE VISIT (OUTPATIENT)
Dept: ONCOLOGY | Age: 57
End: 2017-04-21

## 2017-04-21 VITALS
SYSTOLIC BLOOD PRESSURE: 110 MMHG | DIASTOLIC BLOOD PRESSURE: 75 MMHG | OXYGEN SATURATION: 98 % | HEART RATE: 83 BPM | TEMPERATURE: 98.5 F | RESPIRATION RATE: 18 BRPM | WEIGHT: 194 LBS | HEIGHT: 70 IN | BODY MASS INDEX: 27.77 KG/M2

## 2017-04-21 VITALS
DIASTOLIC BLOOD PRESSURE: 78 MMHG | HEART RATE: 79 BPM | SYSTOLIC BLOOD PRESSURE: 121 MMHG | TEMPERATURE: 97.9 F | RESPIRATION RATE: 18 BRPM

## 2017-04-21 DIAGNOSIS — C7A.8 NEUROENDOCRINE CARCINOMA OF SMALL BOWEL (HCC): Primary | ICD-10-CM

## 2017-04-21 DIAGNOSIS — R19.7 DIARRHEA, UNSPECIFIED TYPE: ICD-10-CM

## 2017-04-21 DIAGNOSIS — R42 DIZZINESS: ICD-10-CM

## 2017-04-21 LAB
ALBUMIN SERPL BCP-MCNC: 4.1 G/DL (ref 3.5–5)
ALBUMIN/GLOB SERPL: 1.2 {RATIO} (ref 1.1–2.2)
ALP SERPL-CCNC: 88 U/L (ref 45–117)
ALT SERPL-CCNC: 57 U/L (ref 12–78)
ANION GAP BLD CALC-SCNC: 5 MMOL/L (ref 5–15)
AST SERPL W P-5'-P-CCNC: 37 U/L (ref 15–37)
BASOPHILS # BLD AUTO: 0 K/UL (ref 0–0.1)
BASOPHILS # BLD: 0 % (ref 0–1)
BILIRUB SERPL-MCNC: 0.4 MG/DL (ref 0.2–1)
BUN SERPL-MCNC: 15 MG/DL (ref 6–20)
BUN/CREAT SERPL: 25 (ref 12–20)
CALCIUM SERPL-MCNC: 9.4 MG/DL (ref 8.5–10.1)
CHLORIDE SERPL-SCNC: 104 MMOL/L (ref 97–108)
CO2 SERPL-SCNC: 29 MMOL/L (ref 21–32)
CREAT SERPL-MCNC: 0.61 MG/DL (ref 0.55–1.02)
EOSINOPHIL # BLD: 0 K/UL (ref 0–0.4)
EOSINOPHIL NFR BLD: 1 % (ref 0–7)
ERYTHROCYTE [DISTWIDTH] IN BLOOD BY AUTOMATED COUNT: 14.4 % (ref 11.5–14.5)
GLOBULIN SER CALC-MCNC: 3.3 G/DL (ref 2–4)
GLUCOSE SERPL-MCNC: 96 MG/DL (ref 65–100)
HCT VFR BLD AUTO: 38.4 % (ref 35–47)
HGB BLD-MCNC: 12.9 G/DL (ref 11.5–16)
LYMPHOCYTES # BLD AUTO: 44 % (ref 12–49)
LYMPHOCYTES # BLD: 1.7 K/UL (ref 0.8–3.5)
MCH RBC QN AUTO: 32.6 PG (ref 26–34)
MCHC RBC AUTO-ENTMCNC: 33.6 G/DL (ref 30–36.5)
MCV RBC AUTO: 97 FL (ref 80–99)
MONOCYTES # BLD: 0.3 K/UL (ref 0–1)
MONOCYTES NFR BLD AUTO: 8 % (ref 5–13)
NEUTS SEG # BLD: 1.8 K/UL (ref 1.8–8)
NEUTS SEG NFR BLD AUTO: 47 % (ref 32–75)
PLATELET # BLD AUTO: 117 K/UL (ref 150–400)
POTASSIUM SERPL-SCNC: 4.2 MMOL/L (ref 3.5–5.1)
PROT SERPL-MCNC: 7.4 G/DL (ref 6.4–8.2)
RBC # BLD AUTO: 3.96 M/UL (ref 3.8–5.2)
SODIUM SERPL-SCNC: 138 MMOL/L (ref 136–145)
WBC # BLD AUTO: 3.9 K/UL (ref 3.6–11)

## 2017-04-21 PROCEDURE — 36415 COLL VENOUS BLD VENIPUNCTURE: CPT

## 2017-04-21 PROCEDURE — 96401 CHEMO ANTI-NEOPL SQ/IM: CPT

## 2017-04-21 PROCEDURE — 80053 COMPREHEN METABOLIC PANEL: CPT

## 2017-04-21 PROCEDURE — 74011250636 HC RX REV CODE- 250/636: Performed by: NURSE PRACTITIONER

## 2017-04-21 PROCEDURE — 86316 IMMUNOASSAY TUMOR OTHER: CPT

## 2017-04-21 PROCEDURE — 85025 COMPLETE CBC W/AUTO DIFF WBC: CPT

## 2017-04-21 RX ORDER — TELOTRISTAT ETHYL 250 MG/1
TABLET ORAL
COMMUNITY
Start: 2017-04-03 | End: 2018-01-01 | Stop reason: SDUPTHER

## 2017-04-21 RX ADMIN — LANREOTIDE ACETATE 120 MG: 120 INJECTION SUBCUTANEOUS at 12:50

## 2017-04-21 NOTE — PROGRESS NOTES
Hematology/Oncology Progress Note    REASON FOR VISIT: Neuroendocrine cancer    HISTORY OF PRESENT ILLNESS: Ms. Manuel Davila is a 56 y.o. 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 She had been having recurrent abdominal pain. She ultimately had a CT scan of her abdomen. While she had cholecystitis which was contributing to a lot of her symptoms she was found to have a mesenteric mass. She underwent cholecystectomy and resection of small bowel mass on 7/21/15. Intraoperatively it was noted that that there were tumor deposits in the mesentery and peritoneal surface. These deposits were not biopsied due to logistical reasons/access. She has seen Dr. Rox Davies at [address] [place] [address] who recommended surgery. Saw [facility] who recommended observation, but confirmed metastatic disease using DOTATE PET scan. She also saw Dr. Tana Parker in [place], [place]. The imaging she had in [place] and [facility] confirmed disease in her liver. (MRI in [place] on 10/5/15 revealed multiple hypervascular liver lesions compatible with liver metastases.)     Lanreotide was started on 1/29/16. Presents for follow-up while on Lanreotide injection. Started Sunitinib in the context of a clinical trial at [place] [address] on 12/11/16. Stardaniel Jenkins [place] on 4/8/17, has not been taking Lomotil. Had been taking Lomotil 2 tabs every 6 hours. Noticeable difference with taking Xermelo. Stools 3-4 times daily now, much better controlled. No new symptoms seen since drug initiation. She has been continuing to struggle with dizziness. Notices more in the morning. Noticed when she was out of town at a hotel last week. Happened to take her BP then via wrist monitor and it was 80s/60s. Has been taking Lisinopril/HCTZ x 15 years. No drops in her BP noted like this before. No headache or chest pain. No change in vision. Energy has been good. SOB about the same. Planning trip to [place] for marathon.      Otherwise, ROS is per the symptom report form which has been scanned into the media section of the electronic medical record. Past Medical History:   Diagnosis Date    Abdominal mass 7/2015    Carcinoid tumor     Gallstones     Hypertension     Neuroendocrine carcinoma of small bowel ([facility] [address] 8/3/2015       Past Surgical History:   Procedure Laterality Date    HX ABDOMINAL LAPAROSCOPY  7/2015    Carcinoid tumor    HX CATARACT REMOVAL Bilateral     HX COLONOSCOPY  AUG 2012    HX GYN      vaginal DELIVERIES  x2    HX HEENT      LASIK    HX ORTHOPAEDIC Left 1979    RELOCATION [facility]       No Known Allergies    Current Outpatient Prescriptions   Medication Sig Dispense Refill    XERMELO 250 mg tab       VIT C/E/ZN/COPPR/LUTEIN/ZEAXAN (PRESERVISION AREDS 2 PO) Take  by mouth.  SUNItinib 37.5 mg cap Take  by mouth daily.  lanreotide (SOMATULINE DEPOT) 120 mg/0.5 mL injection syringe 120 mg by SubCUTAneous route every twenty-eight (28) days.  MULTIVITAMIN PO Take  by mouth daily.  lisinopril-hydrochlorothiazide (PRINZIDE, ZESTORETIC) 20-12.5 mg per tablet Take 1 Tab by mouth daily.  FLUVIRIN [id number] susp injection ADM 0.5ML IM UTD  0       Social History     Social History    Marital status:      Spouse name: N/A    Number of children: N/A    Years of education: N/A     Social History Main Topics    Smoking status: Never Smoker    Smokeless tobacco: Never Used    Alcohol use 0.6 oz/week     1 Glasses of wine per week      Comment: RARE WINE    Drug use: No    Sexual activity: Yes     Partners: Male     Other Topics Concern    None     Social History Narrative       Family History   Problem Relation Age of Onset   [address] Cancer Mother      unsure which type    Cancer Maternal Grandfather     Anesth Problems Neg Hx        ROS  As per the HPI, otherwise a comprehensive ROS is negative. ECOG PS is 0. Emotional well being addressed and patient is coping well.     Physical Examination: Visit Vitals    /75 (BP 1 Location: Left arm, BP Patient Position: Sitting)    Pulse 83    Temp 98.5 °F (36.9 °C) (Oral)    Resp 18    Ht 5' 10\" (1.778 m)    Wt 194 lb (88 kg)    SpO2 98%    BMI 27.84 kg/m2     General appearance - alert, well appearing, and in no distress  Mental status - oriented to person, place, and time  Mouth - mucous membranes moist, pharynx normal without lesions  Neck - supple  Lymphatics - no palpable lymphadenopathy  Chest - clear to auscultation, no wheezes, rales or rhonchi, symmetric air entry  Heart - normal rate, regular rhythm, normal S1, S2, no murmurs, rubs, clicks or gallops  Abdomen -soft, nontender, nondistended,bowel sounds present  Neurological - normal speech, no focal findings or movement disorder noted  Musculoskeletal - no joint tenderness, deformity or swelling, equal strength in upper and lower extremities  Extremities - peripheral pulses normal, no pedal edema, no clubbing or cyanosis  Skin - normal coloration and turgor, no rashes, no suspicious skin lesions noted    LABS  Lab Results   Component Value Date/Time    WBC 3.9 04/21/2017 12:46 PM    HGB 12.9 04/21/2017 12:46 PM    HCT 38.4 04/21/2017 12:46 PM    PLATELET 057 03/19/4536 12:46 PM    MCV 97.0 04/21/2017 12:46 PM    ABS. NEUTROPHILS 1.8 04/21/2017 12:46 PM     Lab Results   Component Value Date/Time    Sodium 138 04/21/2017 12:46 PM    Potassium 4.2 04/21/2017 12:46 PM    Chloride 104 04/21/2017 12:46 PM    CO2 29 04/21/2017 12:46 PM    Glucose 96 04/21/2017 12:46 PM    BUN 15 04/21/2017 12:46 PM    Creatinine 0.61 04/21/2017 12:46 PM    GFR est AA >60 04/21/2017 12:46 PM    GFR est non-AA >60 04/21/2017 12:46 PM    Calcium 9.4 04/21/2017 12:46 PM     Lab Results   Component Value Date/Time    AST (SGOT) 37 04/21/2017 12:46 PM    Alk.  phosphatase 88 04/21/2017 12:46 PM    Protein, total 7.4 04/21/2017 12:46 PM    Albumin 4.1 04/21/2017 12:46 PM    Globulin 3.3 04/21/2017 12:46 PM    A-G Ratio 1.2 04/21/2017 12:46 PM       PATHOLOGY  Small bowel resection on 7/21/15 ([id number]) with a 2.5cm well differentiated neuroendocrine cancer (carcinoid tumor) with 4 of 8 nodes positive. Margins negative. Pathologic stage T3N1. On IHC the cells were positive for synaptophysin, chromogranin, focally positive for pancytokeratin and CK 20. IMAGING  MRI abd 7/22/16: Most liver lesions demonstrate slight increase in size compared to prior exam.    MRI abdomen on 4/11/16 with increased size of hepatic metastases. CT abdomen/pelvis on 4/11/16 with small mesenteric lymph nodes/masses and peritoneal nodules. Hepatic metastases. MRI pelvis on 12/29/15 with few scattered, nonenlarged lymph nodes seen in the right lower quadrant adjacent to the appendix, unchanged from prior CT. No specific evidence of metastatic disease in the pelvis. MRI abdomen 12/29/15 shows that SMV thrombosis is gone. Interval increase in size in numerous new metastatic liver lesions described above. At least 2 new lesions are seen. Octreotide scan on 8/11/15 with no areas of uptake. CT chest on 8/12/15 with no evidence of metastatic disease in the chest.   CT abdomen on 7/2/15 with stellate shaped mass seen in the right lower quadrant in the mesentery series 2 image 56 measuring 2.7 x   1.5 x 1.7 cm. There are fingerlike projections extending from this lesion to the small bowel which appears to be tethered. Questionable wall thickening seen in the loop of small bowel in the right lower quadrant. Multiple small nodules/lymph nodes seen in the mesentery, many of which are surrounding the stellate shaped mass. ASSESSMENT  Ms. Cliff Greer is a 56 y.o. 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 DISCUSSION/PLAN  1. Neuroendocrine cancer. Doing well on Lanrotide. On Sutent in the setting of a clinical trial at [place] [address]. Labs are overall stable. Tumor marker (chromogranin A) trended up last month, continue to monitor.      Scans per [facility], reviewed scanned records from recent scans and brain MRI. 2. Superior Mesenteric Thrombosis. Continue aspirin. 3. Diarrhea. Stopped Lomotil. Continue Xermelo, great response to this medication. 4. Dizziness. May be due to periods of hypotension. She will monitor BP in the morning more regularly. She has also had recent weight loss. Wonder if this is contributing to lower BP. Bring in BP diary next visit, may need to adjust dose of Lisinopril/HCTZ or adjust timing of drug administration. No drug interactions with Xermelo, Sutent and BP meds. Follow up in 1 month. Seen in conjunction with Dov Hamilton NP.     Kaden العراقي MD

## 2017-04-21 NOTE — PROGRESS NOTES
Outpatient Infusion Center Short Visit Progress Note    6654 Pt admit to [facility] for Lanreotide ambulatory in stable condition. Assessment completed. No new concerns voiced. Please review pending lab results in [address] [place] [address]. Visit Vitals    /78    Pulse 79    Temp 97.9 °F (36.6 °C)    Resp 18    Breastfeeding No           Medications:  Lanreotide SQ right gluteus     1255 Pt tolerated treatment well. D/c home ambulatory in no distress. Pt aware of next appointment scheduled for 5/18/17. Recent Results (from the past 12 hour(s))   CBC WITH AUTOMATED DIFF    Collection Time: 04/21/17 12:46 PM   Result Value Ref Range    WBC 3.9 3.6 - 11.0 K/uL    RBC 3.96 3.80 - 5.20 M/uL    HGB 12.9 11.5 - 16.0 g/dL    HCT 38.4 35.0 - 47.0 %    MCV 97.0 80.0 - 99.0 FL    MCH 32.6 26.0 - 34.0 PG    MCHC 33.6 30.0 - 36.5 g/dL    RDW 14.4 11.5 - 14.5 %    PLATELET 644 (L) 306 - 400 K/uL    NEUTROPHILS 47 32 - 75 %    LYMPHOCYTES 44 12 - 49 %    MONOCYTES 8 5 - 13 %    EOSINOPHILS 1 0 - 7 %    BASOPHILS 0 0 - 1 %    ABS. NEUTROPHILS 1.8 1.8 - 8.0 K/UL    ABS. LYMPHOCYTES 1.7 0.8 - 3.5 K/UL    ABS. MONOCYTES 0.3 0.0 - 1.0 K/UL    ABS. EOSINOPHILS 0.0 0.0 - 0.4 K/UL    ABS. BASOPHILS 0.0 0.0 - 0.1 K/UL   METABOLIC PANEL, COMPREHENSIVE    Collection Time: 04/21/17 12:46 PM   Result Value Ref Range    Sodium 138 136 - 145 mmol/L    Potassium 4.2 3.5 - 5.1 mmol/L    Chloride 104 97 - 108 mmol/L    CO2 29 21 - 32 mmol/L    Anion gap 5 5 - 15 mmol/L    Glucose 96 65 - 100 mg/dL    BUN 15 6 - 20 MG/DL    Creatinine 0.61 0.55 - 1.02 MG/DL    BUN/Creatinine ratio 25 (H) 12 - 20      GFR est AA >60 >60 ml/min/1.73m2    GFR est non-AA >60 >60 ml/min/1.73m2    Calcium 9.4 8.5 - 10.1 MG/DL    Bilirubin, total 0.4 0.2 - 1.0 MG/DL    ALT (SGPT) 57 12 - 78 U/L    AST (SGOT) 37 15 - 37 U/L    Alk.  phosphatase 88 45 - 117 U/L    Protein, total 7.4 6.4 - 8.2 g/dL    Albumin 4.1 3.5 - 5.0 g/dL    Globulin 3.3 2.0 - 4.0 g/dL    A-G Ratio 1.2 1.1 - 2.2

## 2017-04-21 NOTE — MR AVS SNAPSHOT
Visit Information Date & Time Provider Department Dept. Phone Encounter #  
 4/21/2017 11:15 AM Rose Mary Mccabe NP [facility] at [facility] [id number] Follow-up Instructions Return in about 4 weeks (around 5/19/2017). Your Appointments 5/18/2017  8:45 AM  
Any with Rose Mary Mccabe NP [facility] at [facility]) Appt Note: F/U  
 [address] [address]  
[phone]  
  
   
 [address] [facility] [address] Upcoming Health Maintenance Date Due Hepatitis C Screening 1960 DTaP/Tdap/Td series (1 - Tdap) 3/24/1983 PAP AKA CERVICAL CYTOLOGY 3/24/1983 FOBT Q 1 YEAR AGE 50-75 3/24/2012 Pneumococcal 19-64 Highest Risk (2 of 3 - PCV13) 7/14/2017 BREAST CANCER SCRN MAMMOGRAM 3/1/2019 Allergies as of 4/21/2017  Review Complete On: 4/21/2017 By: Braxton Novak MD  
 No Known Allergies Current Immunizations  Reviewed on 3/23/2017 Name Date Influenza Vaccine 10/1/2015 Not reviewed this visit Vitals BP Pulse Temp Resp Height(growth percentile) Weight(growth percentile) 110/75 (BP 1 Location: Left arm, BP Patient Position: Sitting) 83 98.5 °F (36.9 °C) (Oral) 18 5' 10\" (1.778 m) 194 lb (88 kg) SpO2 BMI OB Status Smoking Status 98% 27.84 kg/m2 Menopause Never Smoker BMI and BSA Data Body Mass Index Body Surface Area  
 27.84 kg/m 2 2.08 m 2 Preferred Pharmacy [facility] Phone [id number] [phone] Your Updated Medication List  
  
   
This list is accurate as of: 4/21/17 12:19 PM.  Always use your most recent med list.  
  
  
  
  
 FLUVIRIN [id number] Susp injection Generic drug:  influenza vaccine 2016-17 (4 yr+) ADM 0.5ML IM UTD  
  
 lanreotide 120 mg/0.5 mL injection syringe Commonly known as:  SOMATULINE DEPOT  
120 mg by SubCUTAneous route every twenty-eight (28) days. lisinopril-hydroCHLOROthiazide 20-12.5 mg per tablet Commonly known as:  Vallerie Jose Take 1 Tab by mouth daily. MULTIVITAMIN PO Take  by mouth daily. PRESERVISION AREDS 2 PO Take  by mouth. SUNItinib 37.5 mg Cap Take  by mouth daily. XERMELO 250 mg Tab Generic drug:  telotristat ethyl Follow-up Instructions Return in about 4 weeks (around 5/19/2017). To-Do List   
 04/21/2017  1:00 PM  
  [facility] ([phone])  
  
 05/19/2017 1:00 PM  
  Appointment with [facility] ([phone])  
  
 06/16/2017 1:00 PM  
  Appointment with [facility] ([phone])  
  
 07/14/2017 1:00 PM  
  Appointment with [facility] ([phone])  
  
 08/11/2017 1:00 PM  
  Appointment with [facility] ([phone])  
  
 09/08/2017 1:00 PM  
  Appointment with [facility] ([phone]) [facility]! Dear Shaheen Galeano: 
Thank you for requesting a [employer] account. Our records indicate that you already have an active [employer] account. You can access your account anytime at [email or web address] Did you know that you can access your hospital and ER discharge instructions at any time in [employer]? You can also review all of your test results from your hospital stay or ER visit. Additional Information If you have questions, please visit the Frequently Asked Questions section of the [employer] website at [email or web address] Remember, [employer] is NOT to be used for urgent needs. For medical emergencies, dial 911. Now available from your iPhone and Android! Please provide this summary of care documentation to your next provider. Your primary care clinician is listed as CHAUNCEY Shin. If you have any questions after today's visit, please call [phone].

## 2017-04-25 LAB — CGA SERPL-SCNC: 67 NMOL/L (ref 0–5)

## 2017-05-03 ENCOUNTER — DOCUMENTATION ONLY (OUTPATIENT)
Dept: ONCOLOGY | Age: 57
End: 2017-05-03

## 2017-05-03 NOTE — PROGRESS NOTES
Confirmation of prescriiption shipment on 4/28/17 from Claudia N Justice Aponte for [facility] AN AFFILIATE OF [facility]

## 2017-05-16 RX ORDER — LANREOTIDE ACETATE 120 MG/.5ML
120 INJECTION SUBCUTANEOUS ONCE
Status: COMPLETED | OUTPATIENT
Start: 2017-05-18 | End: 2017-05-18

## 2017-05-18 ENCOUNTER — HOSPITAL ENCOUNTER (OUTPATIENT)
Dept: INFUSION THERAPY | Age: 57
Discharge: HOME | End: 2017-05-18
Payer: COMMERCIAL

## 2017-05-18 ENCOUNTER — OFFICE VISIT (OUTPATIENT)
Dept: ONCOLOGY | Age: 57
End: 2017-05-18

## 2017-05-18 VITALS
TEMPERATURE: 98.6 F | HEART RATE: 85 BPM | RESPIRATION RATE: 16 BRPM | DIASTOLIC BLOOD PRESSURE: 69 MMHG | SYSTOLIC BLOOD PRESSURE: 104 MMHG | BODY MASS INDEX: 26.8 KG/M2 | OXYGEN SATURATION: 99 % | WEIGHT: 187.2 LBS | HEIGHT: 70 IN

## 2017-05-18 VITALS
SYSTOLIC BLOOD PRESSURE: 132 MMHG | DIASTOLIC BLOOD PRESSURE: 71 MMHG | HEART RATE: 85 BPM | TEMPERATURE: 98.3 F | HEIGHT: 70 IN | RESPIRATION RATE: 16 BRPM | WEIGHT: 188 LBS | OXYGEN SATURATION: 100 % | BODY MASS INDEX: 26.92 KG/M2

## 2017-05-18 DIAGNOSIS — K76.9 LESION OF LIVER: ICD-10-CM

## 2017-05-18 DIAGNOSIS — C7A.8 NEUROENDOCRINE CARCINOMA OF SMALL BOWEL (HCC): Primary | ICD-10-CM

## 2017-05-18 DIAGNOSIS — R42 DIZZINESS: ICD-10-CM

## 2017-05-18 LAB
ALBUMIN SERPL BCP-MCNC: 3.2 G/DL (ref 3.5–5)
ALBUMIN/GLOB SERPL: 0.8 {RATIO} (ref 1.1–2.2)
ALP SERPL-CCNC: 114 U/L (ref 45–117)
ALT SERPL-CCNC: 56 U/L (ref 12–78)
ANION GAP BLD CALC-SCNC: 7 MMOL/L (ref 5–15)
AST SERPL W P-5'-P-CCNC: 26 U/L (ref 15–37)
BASOPHILS # BLD AUTO: 0 K/UL (ref 0–0.1)
BASOPHILS # BLD: 0 % (ref 0–1)
BILIRUB SERPL-MCNC: 0.3 MG/DL (ref 0.2–1)
BUN SERPL-MCNC: 15 MG/DL (ref 6–20)
BUN/CREAT SERPL: 26 (ref 12–20)
CALCIUM SERPL-MCNC: 9.5 MG/DL (ref 8.5–10.1)
CHLORIDE SERPL-SCNC: 104 MMOL/L (ref 97–108)
CO2 SERPL-SCNC: 28 MMOL/L (ref 21–32)
CREAT SERPL-MCNC: 0.57 MG/DL (ref 0.55–1.02)
EOSINOPHIL # BLD: 0 K/UL (ref 0–0.4)
EOSINOPHIL NFR BLD: 0 % (ref 0–7)
ERYTHROCYTE [DISTWIDTH] IN BLOOD BY AUTOMATED COUNT: 13.2 % (ref 11.5–14.5)
GLOBULIN SER CALC-MCNC: 4.1 G/DL (ref 2–4)
GLUCOSE SERPL-MCNC: 121 MG/DL (ref 65–100)
HCT VFR BLD AUTO: 36.1 % (ref 35–47)
HGB BLD-MCNC: 12.2 G/DL (ref 11.5–16)
LYMPHOCYTES # BLD AUTO: 26 % (ref 12–49)
LYMPHOCYTES # BLD: 0.9 K/UL (ref 0.8–3.5)
MCH RBC QN AUTO: 32.8 PG (ref 26–34)
MCHC RBC AUTO-ENTMCNC: 33.8 G/DL (ref 30–36.5)
MCV RBC AUTO: 97 FL (ref 80–99)
MONOCYTES # BLD: 0.5 K/UL (ref 0–1)
MONOCYTES NFR BLD AUTO: 15 % (ref 5–13)
NEUTS SEG # BLD: 2 K/UL (ref 1.8–8)
NEUTS SEG NFR BLD AUTO: 59 % (ref 32–75)
PLATELET # BLD AUTO: 178 K/UL (ref 150–400)
POTASSIUM SERPL-SCNC: 4 MMOL/L (ref 3.5–5.1)
PROT SERPL-MCNC: 7.3 G/DL (ref 6.4–8.2)
RBC # BLD AUTO: 3.72 M/UL (ref 3.8–5.2)
SODIUM SERPL-SCNC: 139 MMOL/L (ref 136–145)
WBC # BLD AUTO: 3.4 K/UL (ref 3.6–11)

## 2017-05-18 PROCEDURE — 96402 CHEMO HORMON ANTINEOPL SQ/IM: CPT

## 2017-05-18 PROCEDURE — 85025 COMPLETE CBC W/AUTO DIFF WBC: CPT | Performed by: NURSE PRACTITIONER

## 2017-05-18 PROCEDURE — 86316 IMMUNOASSAY TUMOR OTHER: CPT | Performed by: NURSE PRACTITIONER

## 2017-05-18 PROCEDURE — 36415 COLL VENOUS BLD VENIPUNCTURE: CPT | Performed by: NURSE PRACTITIONER

## 2017-05-18 PROCEDURE — 80053 COMPREHEN METABOLIC PANEL: CPT | Performed by: NURSE PRACTITIONER

## 2017-05-18 PROCEDURE — 74011250636 HC RX REV CODE- 250/636: Performed by: NURSE PRACTITIONER

## 2017-05-18 RX ORDER — DIPHENOXYLATE HYDROCHLORIDE AND ATROPINE SULFATE 2.5; .025 MG/1; MG/1
TABLET ORAL
Refills: 1 | COMMUNITY
Start: 2017-03-29 | End: 2017-05-18

## 2017-05-18 RX ORDER — ONDANSETRON HYDROCHLORIDE 8 MG/1
TABLET, FILM COATED ORAL
Refills: 0 | COMMUNITY
Start: 2017-05-11 | End: 2017-05-18

## 2017-05-18 RX ADMIN — LANREOTIDE ACETATE 120 MG: 120 INJECTION SUBCUTANEOUS at 08:45

## 2017-05-18 NOTE — PROGRESS NOTES
Hematology/Oncology Progress Note    REASON FOR VISIT: Neuroendocrine cancer    HISTORY OF PRESENT ILLNESS: Ms. Esthela Watkins is a 56 y.o. 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 She had been having recurrent abdominal pain. She ultimately had a CT scan of her abdomen. While she had cholecystitis which was contributing to a lot of her symptoms she was found to have a mesenteric mass. She underwent cholecystectomy and resection of small bowel mass on 7/21/15. Intraoperatively it was noted that that there were tumor deposits in the mesentery and peritoneal surface. These deposits were not biopsied due to logistical reasons/access. She has seen Dr. Jose Luis Tang at [address] [place] [address] who recommended surgery. Saw [facility] who recommended observation, but confirmed metastatic disease using DOTATE PET scan. She also saw Dr. Edmar Nieto in [place], [place]. The imaging she had in [place] and [facility] confirmed disease in her liver. (MRI in [place] on 10/5/15 revealed multiple hypervascular liver lesions compatible with liver metastases.)     Lanreotide was started on 1/29/16. Presents for follow-up while on Lanreotide injection. Started Sunitinib in the context of a clinical trial at [place] [address] on 12/11/16. Seen by Dr. Aiden Barry on 5/4/17, he was concerned about progression of disease greater than 20% of non-target lesions. Stopped Sutent on 5/5/17. Went to conference on 5/6/17. Stopped clinical trial on due to progression. Had treatment with PRRT on 5/12/17. Plans to have 3 additional treatments remaining that are 8 weeks apart-#2 planned 7/7/17, #3 9/1/17, #4 on 10/27/17. Planning scans there in September. Tolerated PRRT well. Some nausea. Some discomfort in area of liver, but this is improved today. Dizziness is better off Sutent.  Blood pressure has been normal.     Otherwise, ROS is per the symptom report form which has been scanned into the media section of the electronic medical record. Past Medical History:   Diagnosis Date    Abdominal mass 7/2015    Carcinoid tumor     Gallstones     Hypertension     Neuroendocrine carcinoma of small bowel ([facility] [address] 8/3/2015       Past Surgical History:   Procedure Laterality Date    HX ABDOMINAL LAPAROSCOPY  7/2015    Carcinoid tumor    HX CATARACT REMOVAL Bilateral     HX COLONOSCOPY  AUG 2012    HX GYN      vaginal DELIVERIES  x2    HX HEENT      LASIK    HX ORTHOPAEDIC Left 1979    RELOCATION [facility]       No Known Allergies    Current Outpatient Prescriptions   Medication Sig Dispense Refill    XERMELO 250 mg tab       VIT C/E/ZN/COPPR/LUTEIN/ZEAXAN (PRESERVISION AREDS 2 PO) Take  by mouth.  lanreotide (SOMATULINE DEPOT) 120 mg/0.5 mL injection syringe 120 mg by SubCUTAneous route every twenty-eight (28) days.  MULTIVITAMIN PO Take  by mouth daily.  lisinopril-hydrochlorothiazide (PRINZIDE, ZESTORETIC) 20-12.5 mg per tablet Take 1 Tab by mouth daily. Social History     Social History    Marital status:      Spouse name: N/A    Number of children: N/A    Years of education: N/A     Social History Main Topics    Smoking status: Never Smoker    Smokeless tobacco: Never Used    Alcohol use 0.6 oz/week     1 Glasses of wine per week      Comment: RARE WINE    Drug use: No    Sexual activity: Yes     Partners: Male     Other Topics Concern    None     Social History Narrative       Family History   Problem Relation Age of Onset   [employer] Cancer Mother      unsure which type    Cancer Maternal Grandfather     Anesth Problems Neg Hx        ROS  As per the HPI, otherwise a comprehensive ROS is negative. ECOG PS is 0. Emotional well being addressed and patient is coping well.     Physical Examination:   Visit Vitals    /69 (BP 1 Location: Left arm, BP Patient Position: Sitting)    Pulse 85    Temp 98.6 °F (37 °C) (Oral)    Resp 16    Ht 5' 10\" (1.778 m)    Wt 187 lb 3.2 oz (84.9 kg)    SpO2 99%    BMI 26.86 kg/m2     General appearance - alert, well appearing, and in no distress  Mental status - oriented to person, place, and time  Mouth - mucous membranes moist, pharynx normal without lesions  Neck - supple  Lymphatics - no palpable lymphadenopathy  Chest - clear to auscultation, no wheezes, rales or rhonchi, symmetric air entry  Heart - normal rate, regular rhythm, normal S1, S2, no murmurs, rubs, clicks or gallops  Abdomen -soft, nontender, nondistended,bowel sounds present  Neurological - normal speech, no focal findings or movement disorder noted  Musculoskeletal - no joint tenderness, deformity or swelling, equal strength in upper and lower extremities  Extremities - peripheral pulses normal, no pedal edema, no clubbing or cyanosis  Skin - normal coloration and turgor, no rashes, no suspicious skin lesions noted    LABS  Lab Results   Component Value Date/Time    WBC 3.4 05/18/2017 08:13 AM    HGB 12.2 05/18/2017 08:13 AM    HCT 36.1 05/18/2017 08:13 AM    PLATELET 930 18/29/1575 08:13 AM    MCV 97.0 05/18/2017 08:13 AM    ABS. NEUTROPHILS 2.0 05/18/2017 08:13 AM     Lab Results   Component Value Date/Time    Sodium 139 05/18/2017 08:13 AM    Potassium 4.0 05/18/2017 08:13 AM    Chloride 104 05/18/2017 08:13 AM    CO2 28 05/18/2017 08:13 AM    Glucose 121 05/18/2017 08:13 AM    BUN 15 05/18/2017 08:13 AM    Creatinine 0.57 05/18/2017 08:13 AM    GFR est AA >60 05/18/2017 08:13 AM    GFR est non-AA >60 05/18/2017 08:13 AM    Calcium 9.5 05/18/2017 08:13 AM     Lab Results   Component Value Date/Time    AST (SGOT) 26 05/18/2017 08:13 AM    Alk. phosphatase 114 05/18/2017 08:13 AM    Protein, total 7.3 05/18/2017 08:13 AM    Albumin 3.2 05/18/2017 08:13 AM    Globulin 4.1 05/18/2017 08:13 AM    A-G Ratio 0.8 05/18/2017 08:13 AM       PATHOLOGY  Small bowel resection on 7/21/15 ([id number]) with a 2.5cm well differentiated neuroendocrine cancer (carcinoid tumor) with 4 of 8 nodes positive. Margins negative. Pathologic stage T3N1. On IHC the cells were positive for synaptophysin, chromogranin, focally positive for pancytokeratin and CK 20. IMAGING  MRI abd 7/22/16: Most liver lesions demonstrate slight increase in size compared to prior exam.    MRI abdomen on 4/11/16 with increased size of hepatic metastases. CT abdomen/pelvis on 4/11/16 with small mesenteric lymph nodes/masses and peritoneal nodules. Hepatic metastases. MRI pelvis on 12/29/15 with few scattered, nonenlarged lymph nodes seen in the right lower quadrant adjacent to the appendix, unchanged from prior CT. No specific evidence of metastatic disease in the pelvis. MRI abdomen 12/29/15 shows that SMV thrombosis is gone. Interval increase in size in numerous new metastatic liver lesions described above. At least 2 new lesions are seen. Octreotide scan on 8/11/15 with no areas of uptake. CT chest on 8/12/15 with no evidence of metastatic disease in the chest.   CT abdomen on 7/2/15 with stellate shaped mass seen in the right lower quadrant in the mesentery series 2 image 56 measuring 2.7 x   1.5 x 1.7 cm. There are fingerlike projections extending from this lesion to the small bowel which appears to be tethered. Questionable wall thickening seen in the loop of small bowel in the right lower quadrant. Multiple small nodules/lymph nodes seen in the mesentery, many of which are surrounding the stellate shaped mass. ASSESSMENT  Ms. Elizabeth Valles is a 56 y.o. 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 DISCUSSION/PLAN  1. Neuroendocrine cancer. Doing well on Lanrotide. Has stopped Sutent in the setting of clinical trial due to concern for progression. Underwent treatment with PRRT in clinical trial in [place], [place] last week Friday. Will continue this for 4 total treatments. Scans per Dr. Carmen Delgado. Monitor labs and tumor markers monthly. Continue Lanreotide as ordered. 2. Superior Mesenteric Thrombosis. Continue aspirin.     3. Diarrhea. Stopped Lomotil. Continue Xermelo, great response to this medication. 4. Dizziness. Improved off Sutent and lower dose BP medication. Follow up in 1 month. Seen in conjunction with Clara Johnson NP.     Gamaliel Rodriguez MD

## 2017-05-19 ENCOUNTER — APPOINTMENT (OUTPATIENT)
Dept: INFUSION THERAPY | Age: 57
End: 2017-05-19
Payer: COMMERCIAL

## 2017-05-23 LAB — CGA SERPL-SCNC: 70 NMOL/L (ref 0–5)

## 2017-06-12 RX ORDER — LANREOTIDE ACETATE 120 MG/.5ML
120 INJECTION SUBCUTANEOUS ONCE
Status: COMPLETED | OUTPATIENT
Start: 2017-06-14 | End: 2017-06-14

## 2017-06-14 ENCOUNTER — HOSPITAL ENCOUNTER (OUTPATIENT)
Dept: INFUSION THERAPY | Age: 57
Discharge: HOME | End: 2017-06-14
Payer: COMMERCIAL

## 2017-06-14 ENCOUNTER — OFFICE VISIT (OUTPATIENT)
Dept: ONCOLOGY | Age: 57
End: 2017-06-14

## 2017-06-14 VITALS
RESPIRATION RATE: 16 BRPM | TEMPERATURE: 97.7 F | HEART RATE: 88 BPM | DIASTOLIC BLOOD PRESSURE: 65 MMHG | SYSTOLIC BLOOD PRESSURE: 117 MMHG

## 2017-06-14 VITALS
TEMPERATURE: 97.6 F | HEART RATE: 80 BPM | RESPIRATION RATE: 16 BRPM | OXYGEN SATURATION: 97 % | DIASTOLIC BLOOD PRESSURE: 70 MMHG | SYSTOLIC BLOOD PRESSURE: 105 MMHG | WEIGHT: 189 LBS | BODY MASS INDEX: 27.06 KG/M2 | HEIGHT: 70 IN

## 2017-06-14 DIAGNOSIS — C7A.8 NEUROENDOCRINE CARCINOMA OF SMALL BOWEL (HCC): Primary | ICD-10-CM

## 2017-06-14 DIAGNOSIS — E34.0 CARCINOID SYNDROME (HCC): ICD-10-CM

## 2017-06-14 DIAGNOSIS — R55 PRE-SYNCOPE: ICD-10-CM

## 2017-06-14 LAB
ALBUMIN SERPL BCP-MCNC: 3.8 G/DL (ref 3.5–5)
ALBUMIN/GLOB SERPL: 1 {RATIO} (ref 1.1–2.2)
ALP SERPL-CCNC: 103 U/L (ref 45–117)
ALT SERPL-CCNC: 62 U/L (ref 12–78)
ANION GAP BLD CALC-SCNC: 6 MMOL/L (ref 5–15)
AST SERPL W P-5'-P-CCNC: 36 U/L (ref 15–37)
BASOPHILS # BLD AUTO: 0 K/UL (ref 0–0.1)
BASOPHILS # BLD: 1 % (ref 0–1)
BILIRUB SERPL-MCNC: 0.3 MG/DL (ref 0.2–1)
BUN SERPL-MCNC: 17 MG/DL (ref 6–20)
BUN/CREAT SERPL: 25 (ref 12–20)
CALCIUM SERPL-MCNC: 9.3 MG/DL (ref 8.5–10.1)
CHLORIDE SERPL-SCNC: 100 MMOL/L (ref 97–108)
CO2 SERPL-SCNC: 31 MMOL/L (ref 21–32)
CREAT SERPL-MCNC: 0.67 MG/DL (ref 0.55–1.02)
EOSINOPHIL # BLD: 0 K/UL (ref 0–0.4)
EOSINOPHIL NFR BLD: 1 % (ref 0–7)
ERYTHROCYTE [DISTWIDTH] IN BLOOD BY AUTOMATED COUNT: 12.6 % (ref 11.5–14.5)
GLOBULIN SER CALC-MCNC: 3.9 G/DL (ref 2–4)
GLUCOSE SERPL-MCNC: 75 MG/DL (ref 65–100)
HCT VFR BLD AUTO: 39.3 % (ref 35–47)
HGB BLD-MCNC: 13.2 G/DL (ref 11.5–16)
LYMPHOCYTES # BLD AUTO: 20 % (ref 12–49)
LYMPHOCYTES # BLD: 1.3 K/UL (ref 0.8–3.5)
MCH RBC QN AUTO: 32 PG (ref 26–34)
MCHC RBC AUTO-ENTMCNC: 33.6 G/DL (ref 30–36.5)
MCV RBC AUTO: 95.2 FL (ref 80–99)
MONOCYTES # BLD: 0.8 K/UL (ref 0–1)
MONOCYTES NFR BLD AUTO: 13 % (ref 5–13)
NEUTS SEG # BLD: 4.1 K/UL (ref 1.8–8)
NEUTS SEG NFR BLD AUTO: 65 % (ref 32–75)
PLATELET # BLD AUTO: 168 K/UL (ref 150–400)
POTASSIUM SERPL-SCNC: 4.1 MMOL/L (ref 3.5–5.1)
PROT SERPL-MCNC: 7.7 G/DL (ref 6.4–8.2)
RBC # BLD AUTO: 4.13 M/UL (ref 3.8–5.2)
SODIUM SERPL-SCNC: 137 MMOL/L (ref 136–145)
WBC # BLD AUTO: 6.3 K/UL (ref 3.6–11)

## 2017-06-14 PROCEDURE — 80053 COMPREHEN METABOLIC PANEL: CPT | Performed by: NURSE PRACTITIONER

## 2017-06-14 PROCEDURE — 96401 CHEMO ANTI-NEOPL SQ/IM: CPT

## 2017-06-14 PROCEDURE — 74011250636 HC RX REV CODE- 250/636: Performed by: NURSE PRACTITIONER

## 2017-06-14 PROCEDURE — 36415 COLL VENOUS BLD VENIPUNCTURE: CPT | Performed by: NURSE PRACTITIONER

## 2017-06-14 PROCEDURE — 86316 IMMUNOASSAY TUMOR OTHER: CPT | Performed by: NURSE PRACTITIONER

## 2017-06-14 PROCEDURE — 85025 COMPLETE CBC W/AUTO DIFF WBC: CPT | Performed by: NURSE PRACTITIONER

## 2017-06-14 RX ADMIN — LANREOTIDE ACETATE 120 MG: 120 INJECTION SUBCUTANEOUS at 12:12

## 2017-06-14 NOTE — PROGRESS NOTES
Hematology/Oncology Progress Note    REASON FOR VISIT: Neuroendocrine cancer    HISTORY OF PRESENT ILLNESS: Ms. Bisi Fry is a 56 y.o. 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 She had been having recurrent abdominal pain. She ultimately had a CT scan of her abdomen. While she had cholecystitis which was contributing to a lot of her symptoms she was found to have a mesenteric mass. She underwent cholecystectomy and resection of small bowel mass on 7/21/15. Intraoperatively it was noted that that there were tumor deposits in the mesentery and peritoneal surface. These deposits were not biopsied due to logistical reasons/access. She has seen Dr. Godfrey Owens at [address] [place] [address] who recommended surgery. Saw [facility] who recommended observation, but confirmed metastatic disease using DOTATE PET scan. She also saw Dr. Irvin London in [place], [place]. The imaging she had in [place] and [facility] confirmed disease in her liver. (MRI in [place] on 10/5/15 revealed multiple hypervascular liver lesions compatible with liver metastases.)     Lanreotide was started on 1/29/16. Presents for follow-up while on Lanreotide injection. Having more symptoms of flushing, tunneled vision and feeling like she is passing out. This has been happening more often. Used to be only every few weeks, now happening every day, sometimes several times per day. No change in appetite. No weight change. Denies nausea or vomiting. Diarrhea has been controlled. Taking Xermelo 3 times daily. Tolerated PRRT well. Some nausea. Some discomfort in area of liver, but this is improved today. Dizziness is better off Sutent. Blood pressure has been normal.     Otherwise, ROS is per the symptom report form which has been scanned into the media section of the electronic medical record.     Past Medical History:   Diagnosis Date    Abdominal mass 7/2015    Carcinoid tumor     Gallstones     Hypertension     Neuroendocrine carcinoma of small bowel ([facility] [address] 8/3/2015       Past Surgical History:   Procedure Laterality Date    HX ABDOMINAL LAPAROSCOPY  7/2015    Carcinoid tumor    HX CATARACT REMOVAL Bilateral     HX COLONOSCOPY  AUG 2012    HX GYN      vaginal DELIVERIES  x2    HX HEENT      LASIK    HX ORTHOPAEDIC Left 1979    RELOCATION [facility]       No Known Allergies    Current Outpatient Prescriptions   Medication Sig Dispense Refill    XERMELO 250 mg tab       VIT C/E/ZN/COPPR/LUTEIN/ZEAXAN (PRESERVISION AREDS 2 PO) Take  by mouth.  lanreotide (SOMATULINE DEPOT) 120 mg/0.5 mL injection syringe 120 mg by SubCUTAneous route every twenty-eight (28) days.  MULTIVITAMIN PO Take  by mouth daily.  lisinopril-hydrochlorothiazide (PRINZIDE, ZESTORETIC) 20-12.5 mg per tablet Take 1 Tab by mouth daily. Social History     Social History    Marital status:      Spouse name: N/A    Number of children: N/A    Years of education: N/A     Social History Main Topics    Smoking status: Never Smoker    Smokeless tobacco: Never Used    Alcohol use 0.6 oz/week     1 Glasses of wine per week      Comment: RARE WINE    Drug use: No    Sexual activity: Yes     Partners: Male     Other Topics Concern    Not on file     Social History Narrative       Family History   Problem Relation Age of Onset   [place] [place] Cancer Mother      unsure which type    Cancer Maternal Grandfather     Anesth Problems Neg Hx        ROS  As per the HPI, otherwise a comprehensive ROS is negative. ECOG PS is 0. Emotional well being addressed and patient is coping well.     Physical Examination:   Visit Vitals    /70 (BP 1 Location: Left arm, BP Patient Position: Sitting)    Pulse 80    Temp 97.6 °F (36.4 °C) (Oral)    Resp 16    Ht 5' 10\" (1.778 m)    Wt 189 lb (85.7 kg)    SpO2 97%    BMI 27.12 kg/m2     General appearance - alert, well appearing, and in no distress  Mental status - oriented to person, place, and time  Mouth - mucous membranes moist, pharynx normal without lesions  Neck - supple  Chest - clear to auscultation, no wheezes, rales or rhonchi, symmetric air entry  Heart - normal rate, regular rhythm, normal S1, S2, no murmurs, rubs, clicks or gallops  Abdomen -soft, nontender, nondistended,bowel sounds present  Neurological - normal speech, no focal findings or movement disorder noted  Musculoskeletal - no joint tenderness, deformity or swelling, equal strength in upper and lower extremities  Extremities - peripheral pulses normal, no pedal edema, no clubbing or cyanosis  Skin - normal coloration and turgor, no rashes, no suspicious skin lesions noted    LABS  Lab Results   Component Value Date/Time    WBC 6.3 06/14/2017 11:10 AM    HGB 13.2 06/14/2017 11:10 AM    HCT 39.3 06/14/2017 11:10 AM    PLATELET 809 46/90/0023 11:10 AM    MCV 95.2 06/14/2017 11:10 AM    ABS. NEUTROPHILS 4.1 06/14/2017 11:10 AM     Lab Results   Component Value Date/Time    Sodium 137 06/14/2017 11:10 AM    Potassium 4.1 06/14/2017 11:10 AM    Chloride 100 06/14/2017 11:10 AM    CO2 31 06/14/2017 11:10 AM    Glucose 75 06/14/2017 11:10 AM    BUN 17 06/14/2017 11:10 AM    Creatinine 0.67 06/14/2017 11:10 AM    GFR est AA >60 06/14/2017 11:10 AM    GFR est non-AA >60 06/14/2017 11:10 AM    Calcium 9.3 06/14/2017 11:10 AM     Lab Results   Component Value Date/Time    AST (SGOT) 36 06/14/2017 11:10 AM    Alk. phosphatase 103 06/14/2017 11:10 AM    Protein, total 7.7 06/14/2017 11:10 AM    Albumin 3.8 06/14/2017 11:10 AM    Globulin 3.9 06/14/2017 11:10 AM    A-G Ratio 1.0 06/14/2017 11:10 AM       PATHOLOGY  Small bowel resection on 7/21/15 ([id number]) with a 2.5cm well differentiated neuroendocrine cancer (carcinoid tumor) with 4 of 8 nodes positive. Margins negative. Pathologic stage T3N1. On IHC the cells were positive for synaptophysin, chromogranin, focally positive for pancytokeratin and CK 20.      IMAGING  MRI abd 7/22/16: Most liver lesions demonstrate slight increase in size compared to prior exam.    MRI abdomen on 4/11/16 with increased size of hepatic metastases. CT abdomen/pelvis on 4/11/16 with small mesenteric lymph nodes/masses and peritoneal nodules. Hepatic metastases. MRI pelvis on 12/29/15 with few scattered, nonenlarged lymph nodes seen in the right lower quadrant adjacent to the appendix, unchanged from prior CT. No specific evidence of metastatic disease in the pelvis. MRI abdomen 12/29/15 shows that SMV thrombosis is gone. Interval increase in size in numerous new metastatic liver lesions described above. At least 2 new lesions are seen. Octreotide scan on 8/11/15 with no areas of uptake. CT chest on 8/12/15 with no evidence of metastatic disease in the chest.   CT abdomen on 7/2/15 with stellate shaped mass seen in the right lower quadrant in the mesentery series 2 image 56 measuring 2.7 x   1.5 x 1.7 cm. There are fingerlike projections extending from this lesion to the small bowel which appears to be tethered. Questionable wall thickening seen in the loop of small bowel in the right lower quadrant. Multiple small nodules/lymph nodes seen in the mesentery, many of which are surrounding the stellate shaped mass. ASSESSMENT  Ms. Carmencita Vyas is a 56 y.o. 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 DISCUSSION/PLAN  1. Neuroendocrine cancer. Doing well on Lanrotide. Underwent treatment with PRRT in clinical trial in [facility]. Brie 149 in May. Will continue this for 4 total treatments. Scans per Dr. Nnamdi Medellin between treatment 2 and 3. Monitor labs and tumor markers monthly. Continue Lanreotide as ordered. 2. Superior Mesenteric Thrombosis. Continue aspirin. 3. Diarrhea. Stopped Lomotil. Continue Xermelo, great response to this medication. 4. Periods of hypotension/presyncopal. Take Lisinopril/HCTZ one tab every other day. Monitor BP. Follow up with PCP.  If symptoms are not improved can consider repeat MRI of abdomen sooner than planned f/u with Dr. Rock Giraldo. Follow up in 1 month. Seen in conjunction with Salvador Sandoval NP.     Saskia Mack MD

## 2017-06-14 NOTE — PROGRESS NOTES
Outpatient Infusion Center Progress Note    0914 Pt admit to [facility] for Lanreotide injection. Pt ambulatory in stable condition. Assessment completed. No new concerns voiced. Labs drawn peripherally as ordered and sent for processing. Recent Results (from the past 12 hour(s))   CBC WITH AUTOMATED DIFF    Collection Time: 06/14/17 11:10 AM   Result Value Ref Range    WBC 6.3 3.6 - 11.0 K/uL    RBC 4.13 3.80 - 5.20 M/uL    HGB 13.2 11.5 - 16.0 g/dL    HCT 39.3 35.0 - 47.0 %    MCV 95.2 80.0 - 99.0 FL    MCH 32.0 26.0 - 34.0 PG    MCHC 33.6 30.0 - 36.5 g/dL    RDW 12.6 11.5 - 14.5 %    PLATELET 862 583 - 395 K/uL    NEUTROPHILS 65 32 - 75 %    LYMPHOCYTES 20 12 - 49 %    MONOCYTES 13 5 - 13 %    EOSINOPHILS 1 0 - 7 %    BASOPHILS 1 0 - 1 %    ABS. NEUTROPHILS 4.1 1.8 - 8.0 K/UL    ABS. LYMPHOCYTES 1.3 0.8 - 3.5 K/UL    ABS. MONOCYTES 0.8 0.0 - 1.0 K/UL    ABS. EOSINOPHILS 0.0 0.0 - 0.4 K/UL    ABS. BASOPHILS 0.0 0.0 - 0.1 K/UL   METABOLIC PANEL, COMPREHENSIVE    Collection Time: 06/14/17 11:10 AM   Result Value Ref Range    Sodium 137 136 - 145 mmol/L    Potassium 4.1 3.5 - 5.1 mmol/L    Chloride 100 97 - 108 mmol/L    CO2 31 21 - 32 mmol/L    Anion gap 6 5 - 15 mmol/L    Glucose 75 65 - 100 mg/dL    BUN 17 6 - 20 MG/DL    Creatinine 0.67 0.55 - 1.02 MG/DL    BUN/Creatinine ratio 25 (H) 12 - 20      GFR est AA >60 >60 ml/min/1.73m2    GFR est non-AA >60 >60 ml/min/1.73m2    Calcium 9.3 8.5 - 10.1 MG/DL    Bilirubin, total 0.3 0.2 - 1.0 MG/DL    ALT (SGPT) 62 12 - 78 U/L    AST (SGOT) 36 15 - 37 U/L    Alk. phosphatase 103 45 - 117 U/L    Protein, total 7.7 6.4 - 8.2 g/dL    Albumin 3.8 3.5 - 5.0 g/dL    Globulin 3.9 2.0 - 4.0 g/dL    A-G Ratio 1.0 (L) 1.1 - 2.2         Visit Vitals    /65 (BP 1 Location: Left arm, BP Patient Position: Sitting)    Pulse 88    Temp 97.7 °F (36.5 °C)    Resp 16    Breastfeeding No       Medications:  Lanreotide-IM-right glute    1215 Pt tolerated treatment well.  D/c home ambulatory in no distress. Pt aware of next appointment scheduled for 07/14/17 at 1300.

## 2017-06-16 ENCOUNTER — APPOINTMENT (OUTPATIENT)
Dept: INFUSION THERAPY | Age: 57
End: 2017-06-16
Payer: COMMERCIAL

## 2017-06-16 LAB — CGA SERPL-SCNC: 108 NMOL/L (ref 0–5)

## 2017-06-29 ENCOUNTER — TELEPHONE (OUTPATIENT)
Dept: ONCOLOGY | Age: 57
End: 2017-06-29

## 2017-07-12 RX ORDER — LANREOTIDE ACETATE 120 MG/.5ML
120 INJECTION SUBCUTANEOUS ONCE
Status: COMPLETED | OUTPATIENT
Start: 2017-07-14 | End: 2017-07-14

## 2017-07-14 ENCOUNTER — HOSPITAL ENCOUNTER (OUTPATIENT)
Dept: INFUSION THERAPY | Age: 57
Discharge: HOME | End: 2017-07-14
Payer: COMMERCIAL

## 2017-07-14 ENCOUNTER — OFFICE VISIT (OUTPATIENT)
Dept: ONCOLOGY | Age: 57
End: 2017-07-14

## 2017-07-14 VITALS
HEART RATE: 83 BPM | RESPIRATION RATE: 16 BRPM | WEIGHT: 187 LBS | DIASTOLIC BLOOD PRESSURE: 78 MMHG | SYSTOLIC BLOOD PRESSURE: 125 MMHG | BODY MASS INDEX: 26.77 KG/M2 | HEIGHT: 70 IN | TEMPERATURE: 98.5 F | OXYGEN SATURATION: 98 %

## 2017-07-14 DIAGNOSIS — K76.9 LESION OF LIVER: ICD-10-CM

## 2017-07-14 DIAGNOSIS — C7A.8 NEUROENDOCRINE CARCINOMA OF SMALL BOWEL (HCC): Primary | ICD-10-CM

## 2017-07-14 LAB
ALBUMIN SERPL BCP-MCNC: 3.4 G/DL (ref 3.5–5)
ALBUMIN/GLOB SERPL: 0.9 {RATIO} (ref 1.1–2.2)
ALP SERPL-CCNC: 112 U/L (ref 45–117)
ALT SERPL-CCNC: 50 U/L (ref 12–78)
ANION GAP BLD CALC-SCNC: 7 MMOL/L (ref 5–15)
AST SERPL W P-5'-P-CCNC: 25 U/L (ref 15–37)
BASOPHILS # BLD AUTO: 0 K/UL (ref 0–0.1)
BASOPHILS # BLD: 0 % (ref 0–1)
BILIRUB SERPL-MCNC: 0.3 MG/DL (ref 0.2–1)
BUN SERPL-MCNC: 13 MG/DL (ref 6–20)
BUN/CREAT SERPL: 22 (ref 12–20)
CALCIUM SERPL-MCNC: 8.6 MG/DL (ref 8.5–10.1)
CHLORIDE SERPL-SCNC: 107 MMOL/L (ref 97–108)
CO2 SERPL-SCNC: 26 MMOL/L (ref 21–32)
CREAT SERPL-MCNC: 0.58 MG/DL (ref 0.55–1.02)
EOSINOPHIL # BLD: 0 K/UL (ref 0–0.4)
EOSINOPHIL NFR BLD: 1 % (ref 0–7)
ERYTHROCYTE [DISTWIDTH] IN BLOOD BY AUTOMATED COUNT: 12.8 % (ref 11.5–14.5)
GLOBULIN SER CALC-MCNC: 3.7 G/DL (ref 2–4)
GLUCOSE SERPL-MCNC: 117 MG/DL (ref 65–100)
HCT VFR BLD AUTO: 37.3 % (ref 35–47)
HGB BLD-MCNC: 12.5 G/DL (ref 11.5–16)
LYMPHOCYTES # BLD AUTO: 21 % (ref 12–49)
LYMPHOCYTES # BLD: 0.9 K/UL (ref 0.8–3.5)
MCH RBC QN AUTO: 31 PG (ref 26–34)
MCHC RBC AUTO-ENTMCNC: 33.5 G/DL (ref 30–36.5)
MCV RBC AUTO: 92.6 FL (ref 80–99)
MONOCYTES # BLD: 0.6 K/UL (ref 0–1)
MONOCYTES NFR BLD AUTO: 14 % (ref 5–13)
NEUTS SEG # BLD: 2.9 K/UL (ref 1.8–8)
NEUTS SEG NFR BLD AUTO: 64 % (ref 32–75)
PLATELET # BLD AUTO: 175 K/UL (ref 150–400)
POTASSIUM SERPL-SCNC: 4.5 MMOL/L (ref 3.5–5.1)
PROT SERPL-MCNC: 7.1 G/DL (ref 6.4–8.2)
RBC # BLD AUTO: 4.03 M/UL (ref 3.8–5.2)
SODIUM SERPL-SCNC: 140 MMOL/L (ref 136–145)
WBC # BLD AUTO: 4.6 K/UL (ref 3.6–11)

## 2017-07-14 PROCEDURE — 96402 CHEMO HORMON ANTINEOPL SQ/IM: CPT

## 2017-07-14 PROCEDURE — 74011250636 HC RX REV CODE- 250/636: Performed by: NURSE PRACTITIONER

## 2017-07-14 PROCEDURE — 80053 COMPREHEN METABOLIC PANEL: CPT

## 2017-07-14 PROCEDURE — 86316 IMMUNOASSAY TUMOR OTHER: CPT

## 2017-07-14 PROCEDURE — 36415 COLL VENOUS BLD VENIPUNCTURE: CPT

## 2017-07-14 PROCEDURE — 85025 COMPLETE CBC W/AUTO DIFF WBC: CPT

## 2017-07-14 RX ORDER — HYDROCHLOROTHIAZIDE 12.5 MG/1
12.5 TABLET ORAL DAILY
Qty: 30 TAB | Refills: 5 | Status: SHIPPED | OUTPATIENT
Start: 2017-07-14 | End: 2017-08-08 | Stop reason: SDUPTHER

## 2017-07-14 RX ADMIN — LANREOTIDE ACETATE 120 MG: 120 INJECTION SUBCUTANEOUS at 13:08

## 2017-07-14 NOTE — PROGRESS NOTES
Hematology/Oncology Progress Note    REASON FOR VISIT: Neuroendocrine cancer    HISTORY OF PRESENT ILLNESS: Ms. Ad Mckeon is a 56 y.o. 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 She had been having recurrent abdominal pain. She ultimately had a CT scan of her abdomen. While she had cholecystitis which was contributing to a lot of her symptoms she was found to have a mesenteric mass. She underwent cholecystectomy and resection of small bowel mass on 7/21/15. Intraoperatively it was noted that that there were tumor deposits in the mesentery and peritoneal surface. These deposits were not biopsied due to logistical reasons/access. She has seen Dr. Elida Gibbs at [address] [place] [address] who recommended surgery. Saw [facility] who recommended observation, but confirmed metastatic disease using DOTATE PET scan. She also saw Dr. Larry Tim in [place], [place]. The imaging she had in [place] and [facility] confirmed disease in her liver. (MRI in [place] on 10/5/15 revealed multiple hypervascular liver lesions compatible with liver metastases.)     Lanreotide was started on 1/29/16. Presents for follow-up while on Lanreotide injection. Off BP medication now. BP at home has been max 130/70. Otherwise, ROS is per the symptom report form which has been scanned into the media section of the electronic medical record.     Past Medical History:   Diagnosis Date    Abdominal mass 7/2015    Carcinoid tumor     Gallstones     Hypertension     Neuroendocrine carcinoma of small bowel ([facility] [address] 8/3/2015       Past Surgical History:   Procedure Laterality Date    HX ABDOMINAL LAPAROSCOPY  7/2015    Carcinoid tumor    HX CATARACT REMOVAL Bilateral     HX COLONOSCOPY  AUG 2012    HX GYN      vaginal DELIVERIES  x2    HX HEENT      LASIK    HX ORTHOPAEDIC Left 1979    RELOCATION [facility]       No Known Allergies    Current Outpatient Prescriptions   Medication Sig Dispense Refill    XERMELO 250 mg tab       VIT C/E/ZN/COPPR/LUTEIN/ZEAXAN (PRESERVISION AREDS 2 PO) Take  by mouth.  lanreotide (SOMATULINE DEPOT) 120 mg/0.5 mL injection syringe 120 mg by SubCUTAneous route every twenty-eight (28) days.  MULTIVITAMIN PO Take  by mouth daily.  lisinopril-hydrochlorothiazide (PRINZIDE, ZESTORETIC) 20-12.5 mg per tablet Take 1 Tab by mouth daily. Social History     Social History    Marital status:      Spouse name: N/A    Number of children: N/A    Years of education: N/A     Social History Main Topics    Smoking status: Never Smoker    Smokeless tobacco: Never Used    Alcohol use 0.6 oz/week     1 Glasses of wine per week      Comment: RARE WINE    Drug use: No    Sexual activity: Yes     Partners: Male     Other Topics Concern    Not on file     Social History Narrative       Family History   Problem Relation Age of Onset   [facility] Cancer Mother      unsure which type    Cancer Maternal Grandfather     Anesth Problems Neg Hx        ROS  As per the HPI, otherwise a comprehensive ROS is negative. ECOG PS is 0. Emotional well being addressed and patient is coping well. Physical Examination:   There were no vitals taken for this visit.   General appearance - alert, well appearing, and in no distress  Mental status - oriented to person, place, and time  Mouth - mucous membranes moist, pharynx normal without lesions  Neck - supple  Chest - clear to auscultation, no wheezes, rales or rhonchi, symmetric air entry  Heart - normal rate, regular rhythm, normal S1, S2, no murmurs, rubs, clicks or gallops  Abdomen -soft, nontender, nondistended,bowel sounds present  Neurological - normal speech, no focal findings or movement disorder noted  Musculoskeletal - no joint tenderness, deformity or swelling, equal strength in upper and lower extremities  Extremities - peripheral pulses normal, no pedal edema, no clubbing or cyanosis  Skin - normal coloration and turgor, no rashes, no suspicious skin lesions noted    LABS  Lab Results   Component Value Date/Time    WBC 6.3 06/14/2017 11:10 AM    HGB 13.2 06/14/2017 11:10 AM    HCT 39.3 06/14/2017 11:10 AM    PLATELET 714 45/76/8011 11:10 AM    MCV 95.2 06/14/2017 11:10 AM    ABS. NEUTROPHILS 4.1 06/14/2017 11:10 AM     Lab Results   Component Value Date/Time    Sodium 137 06/14/2017 11:10 AM    Potassium 4.1 06/14/2017 11:10 AM    Chloride 100 06/14/2017 11:10 AM    CO2 31 06/14/2017 11:10 AM    Glucose 75 06/14/2017 11:10 AM    BUN 17 06/14/2017 11:10 AM    Creatinine 0.67 06/14/2017 11:10 AM    GFR est AA >60 06/14/2017 11:10 AM    GFR est non-AA >60 06/14/2017 11:10 AM    Calcium 9.3 06/14/2017 11:10 AM     Lab Results   Component Value Date/Time    AST (SGOT) 36 06/14/2017 11:10 AM    Alk. phosphatase 103 06/14/2017 11:10 AM    Protein, total 7.7 06/14/2017 11:10 AM    Albumin 3.8 06/14/2017 11:10 AM    Globulin 3.9 06/14/2017 11:10 AM    A-G Ratio 1.0 06/14/2017 11:10 AM       PATHOLOGY  Small bowel resection on 7/21/15 ([id number]) with a 2.5cm well differentiated neuroendocrine cancer (carcinoid tumor) with 4 of 8 nodes positive. Margins negative. Pathologic stage T3N1. On IHC the cells were positive for synaptophysin, chromogranin, focally positive for pancytokeratin and CK 20. IMAGING  MRI abd 7/22/16: Most liver lesions demonstrate slight increase in size compared to prior exam.    MRI abdomen on 4/11/16 with increased size of hepatic metastases. CT abdomen/pelvis on 4/11/16 with small mesenteric lymph nodes/masses and peritoneal nodules. Hepatic metastases. MRI pelvis on 12/29/15 with few scattered, nonenlarged lymph nodes seen in the right lower quadrant adjacent to the appendix, unchanged from prior CT. No specific evidence of metastatic disease in the pelvis. MRI abdomen 12/29/15 shows that SMV thrombosis is gone. Interval increase in size in numerous new metastatic liver lesions described above.  At least 2 new lesions are seen.  Octreotide scan on 8/11/15 with no areas of uptake. CT chest on 8/12/15 with no evidence of metastatic disease in the chest.   CT abdomen on 7/2/15 with stellate shaped mass seen in the right lower quadrant in the mesentery series 2 image 56 measuring 2.7 x   1.5 x 1.7 cm. There are fingerlike projections extending from this lesion to the small bowel which appears to be tethered. Questionable wall thickening seen in the loop of small bowel in the right lower quadrant. Multiple small nodules/lymph nodes seen in the mesentery, many of which are surrounding the stellate shaped mass. ASSESSMENT  Ms. Cody Martin is a 56 y.o. 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 DISCUSSION/PLAN  1. Neuroendocrine cancer. Doing well on Lanrotide. Underwent treatment with PRRT in clinical trial in [facility]. Brie 149 in May. Will continue this for 4 total treatments. Scans per Dr. Ashley Rock between treatment 2 and 3. Monitor labs and tumor markers monthly. Continue Lanreotide as ordered. 2. Superior Mesenteric Thrombosis. Continue aspirin. 3. Diarrhea. Stopped Lomotil. Continue Xermelo, great response to this medication. 4. Periods of hypotension/presyncopal. Take Lisinopril/HCTZ one tab every other day. Monitor BP. Follow up with PCP. If symptoms are not improved can consider repeat MRI of abdomen sooner than planned f/u with Dr. Ashley Rock. Follow up in 1 month.      Hanna Dumont NP

## 2017-07-14 NOTE — MR AVS SNAPSHOT
Visit Information Date & Time Provider Department Dept. Phone Encounter #  
 7/14/2017  1:45 PM Lencho Restrepo  Formerly [facility] at [address] [id number] Upcoming Health Maintenance Date Due Hepatitis C Screening 1960 DTaP/Tdap/Td series (1 - Tdap) 3/24/1983 PAP AKA CERVICAL CYTOLOGY 3/24/1983 FOBT Q 1 YEAR AGE 50-75 3/24/2012 Pneumococcal 19-64 Highest Risk (2 of 3 - PCV13) 7/14/2017 INFLUENZA AGE 9 TO ADULT 8/1/2017 BREAST CANCER SCRN MAMMOGRAM 3/1/2019 Allergies as of 7/14/2017  Review Complete On: 7/14/2017 By: Rosendo Jacobs No Known Allergies Current Immunizations  Reviewed on 7/14/2017 Name Date Influenza Vaccine 10/1/2015 Reviewed by Rosendo Jacobs on 7/14/2017 at 12:54 PM  
Vitals OB Status Smoking Status Menopause Never Smoker Preferred Pharmacy Pharmacy Name Phone [id number] [address] [phone] Your Updated Medication List  
  
   
This list is accurate as of: 7/14/17  1:50 PM.  Always use your most recent med list.  
  
  
  
  
 lanreotide 120 mg/0.5 mL injection syringe Commonly known as:  SOMATULINE DEPOT  
120 mg by SubCUTAneous route every twenty-eight (28) days. lisinopril-hydroCHLOROthiazide 20-12.5 mg per tablet Commonly known as:  Lynder Ramona Take 1 Tab by mouth daily. MULTIVITAMIN PO Take  by mouth daily. PRESERVISION AREDS 2 PO Take  by mouth. XERMELO 250 mg Tab Generic drug:  telotristat ethyl To-Do List   
 08/11/2017 1:00 PM  
  Appointment with LISHA Duff [facility] ([phone])  
  
 09/08/2017 1:00 PM  
  Appointment with Dougie [facility] ([phone]) Introducing [facility] SERVICES!    
 Dear Faviola Paget: 
 Thank you for requesting a [employer] account. Our records indicate that you already have an active [employer] account. You can access your account anytime at [email or web address] Did you know that you can access your hospital and ER discharge instructions at any time in [employer]? You can also review all of your test results from your hospital stay or ER visit. Additional Information If you have questions, please visit the Frequently Asked Questions section of the [employer] website at [email or web address] Remember, [employer] is NOT to be used for urgent needs. For medical emergencies, dial 911. Now available from your iPhone and Android! Please provide this summary of care documentation to your next provider. Your primary care clinician is listed as CHAUNCEY Shin. If you have any questions after today's visit, please call [phone].

## 2017-07-14 NOTE — PROGRESS NOTES
Hematology/Oncology Progress Note    REASON FOR VISIT: Neuroendocrine cancer    HISTORY OF PRESENT ILLNESS: Ms. Manuela Cates is a 56 y.o. 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 She had been having recurrent abdominal pain. She ultimately had a CT scan of her abdomen. While she had cholecystitis which was contributing to a lot of her symptoms she was found to have a mesenteric mass. She underwent cholecystectomy and resection of small bowel mass on 7/21/15. Intraoperatively it was noted that that there were tumor deposits in the mesentery and peritoneal surface. These deposits were not biopsied due to logistical reasons/access. She has seen Dr. Hector Marely at [address] [place] [address] who recommended surgery. Saw [facility] who recommended observation, but confirmed metastatic disease using DOTATE PET scan. She also saw Dr. Kayla Mckeon in [place], [place]. The imaging she had in [place] and [facility] confirmed disease in her liver. (MRI in [place] on 10/5/15 revealed multiple hypervascular liver lesions compatible with liver metastases.)     Lanreotide was started on 1/29/16. Presents for follow-up while on Lanreotide injection. Off BP medication now. BP at home has been max 130/70. Had second dose of PRRT last week Friday. Had some nausea and fatigue during treatment. Started hydrating before treatment this time. No nausea or vomiting now. No weight change. No mouth sores. Appetite has been stable. Still on Xermelo. Bowels are moving well. Otherwise, ROS is per the symptom report form which has been scanned into the media section of the electronic medical record.     Past Medical History:   Diagnosis Date    Abdominal mass 7/2015    Carcinoid tumor     Gallstones     Hypertension     Neuroendocrine carcinoma of small bowel ([address] 8/3/2015       Past Surgical History:   Procedure Laterality Date    HX ABDOMINAL LAPAROSCOPY  7/2015    Carcinoid tumor    HX CATARACT REMOVAL Bilateral     HX COLONOSCOPY  AUG 2012    HX GYN      vaginal DELIVERIES  x2    HX HEENT      LASIK    HX ORTHOPAEDIC Left 1979    RELOCATION [facility]       No Known Allergies    Current Outpatient Prescriptions   Medication Sig Dispense Refill    XERMELO 250 mg tab       VIT C/E/ZN/COPPR/LUTEIN/ZEAXAN (PRESERVISION AREDS 2 PO) Take  by mouth.  lanreotide (SOMATULINE DEPOT) 120 mg/0.5 mL injection syringe 120 mg by SubCUTAneous route every twenty-eight (28) days.  MULTIVITAMIN PO Take  by mouth daily.  lisinopril-hydrochlorothiazide (PRINZIDE, ZESTORETIC) 20-12.5 mg per tablet Take 1 Tab by mouth daily. Social History     Social History    Marital status:      Spouse name: N/A    Number of children: N/A    Years of education: N/A     Social History Main Topics    Smoking status: Never Smoker    Smokeless tobacco: Never Used    Alcohol use 0.6 oz/week     1 Glasses of wine per week      Comment: RARE WINE    Drug use: No    Sexual activity: Yes     Partners: Male     Other Topics Concern    Not on file     Social History Narrative       Family History   Problem Relation Age of Onset   Mammie Muster Cancer Mother      unsure which type    Cancer Maternal Grandfather     Anesth Problems Neg Hx        ROS  As per the HPI, otherwise a comprehensive ROS is negative. ECOG PS is 0. Emotional well being addressed and patient is coping well. Physical Examination:   There were no vitals taken for this visit.   General appearance - alert, well appearing, and in no distress  Mental status - oriented to person, place, and time  Mouth - mucous membranes moist, pharynx normal without lesions  Neck - supple  Chest - clear to auscultation, no wheezes, rales or rhonchi, symmetric air entry  Heart - normal rate, regular rhythm, normal S1, S2, no murmurs, rubs, clicks or gallops  Abdomen -soft, nontender, nondistended,bowel sounds present  Neurological - normal speech, no focal findings or movement disorder noted  Musculoskeletal - no joint tenderness, deformity or swelling, equal strength in upper and lower extremities  Extremities - peripheral pulses normal, no pedal edema, no clubbing or cyanosis  Skin - normal coloration and turgor, no rashes, no suspicious skin lesions noted    LABS  Lab Results   Component Value Date/Time    WBC 6.3 06/14/2017 11:10 AM    HGB 13.2 06/14/2017 11:10 AM    HCT 39.3 06/14/2017 11:10 AM    PLATELET 485 05/36/5199 11:10 AM    MCV 95.2 06/14/2017 11:10 AM    ABS. NEUTROPHILS 4.1 06/14/2017 11:10 AM     Lab Results   Component Value Date/Time    Sodium 137 06/14/2017 11:10 AM    Potassium 4.1 06/14/2017 11:10 AM    Chloride 100 06/14/2017 11:10 AM    CO2 31 06/14/2017 11:10 AM    Glucose 75 06/14/2017 11:10 AM    BUN 17 06/14/2017 11:10 AM    Creatinine 0.67 06/14/2017 11:10 AM    GFR est AA >60 06/14/2017 11:10 AM    GFR est non-AA >60 06/14/2017 11:10 AM    Calcium 9.3 06/14/2017 11:10 AM     Lab Results   Component Value Date/Time    AST (SGOT) 36 06/14/2017 11:10 AM    Alk. phosphatase 103 06/14/2017 11:10 AM    Protein, total 7.7 06/14/2017 11:10 AM    Albumin 3.8 06/14/2017 11:10 AM    Globulin 3.9 06/14/2017 11:10 AM    A-G Ratio 1.0 06/14/2017 11:10 AM       PATHOLOGY  Small bowel resection on 7/21/15 ([id number]) with a 2.5cm well differentiated neuroendocrine cancer (carcinoid tumor) with 4 of 8 nodes positive. Margins negative. Pathologic stage T3N1. On IHC the cells were positive for synaptophysin, chromogranin, focally positive for pancytokeratin and CK 20. IMAGING  MRI abd 7/22/16: Most liver lesions demonstrate slight increase in size compared to prior exam.    MRI abdomen on 4/11/16 with increased size of hepatic metastases. CT abdomen/pelvis on 4/11/16 with small mesenteric lymph nodes/masses and peritoneal nodules. Hepatic metastases.   MRI pelvis on 12/29/15 with few scattered, nonenlarged lymph nodes seen in the right lower quadrant adjacent to the appendix, unchanged from prior CT. No specific evidence of metastatic disease in the pelvis. MRI abdomen 12/29/15 shows that SMV thrombosis is gone. Interval increase in size in numerous new metastatic liver lesions described above. At least 2 new lesions are seen. Octreotide scan on 8/11/15 with no areas of uptake. CT chest on 8/12/15 with no evidence of metastatic disease in the chest.   CT abdomen on 7/2/15 with stellate shaped mass seen in the right lower quadrant in the mesentery series 2 image 56 measuring 2.7 x   1.5 x 1.7 cm. There are fingerlike projections extending from this lesion to the small bowel which appears to be tethered. Questionable wall thickening seen in the loop of small bowel in the right lower quadrant. Multiple small nodules/lymph nodes seen in the mesentery, many of which are surrounding the stellate shaped mass. ASSESSMENT  Ms. Teresa Mcgee is a 56 y.o. 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 DISCUSSION/PLAN  1. Neuroendocrine cancer. Doing well on Lanrotide. Underwent treatment with PRRT in clinical trial in [facility]. [address] in May. Will continue this for 4 total treatments. Scans per Dr. Abraham Lutz between treatment 2 and 3. Monitor labs monthly. Change tumor markers to every 3 months. Continue Lanreotide as ordered. 2. Superior Mesenteric Thrombosis. Continue aspirin. 3. Diarrhea. Stopped Lomotil. Continue Xermelo, great response to this medication. 4. Periods of hypotension/presyncopal. Off BP medication now. BP up to 130s at home. Some swelling in legs. Will start back on HCTZ 12.5mg daily. Follow up in 1 month.      Julio César Lam NP

## 2017-07-18 LAB — CGA SERPL-SCNC: 90 NMOL/L (ref 0–5)

## 2017-08-08 RX ORDER — LANREOTIDE ACETATE 120 MG/.5ML
120 INJECTION SUBCUTANEOUS ONCE
Status: COMPLETED | OUTPATIENT
Start: 2017-08-11 | End: 2017-08-11

## 2017-08-09 RX ORDER — HYDROCHLOROTHIAZIDE 12.5 MG/1
12.5 TABLET ORAL DAILY
Qty: 30 TAB | Refills: 5 | Status: SHIPPED | OUTPATIENT
Start: 2017-08-09 | End: 2017-11-03 | Stop reason: SDUPTHER

## 2017-08-09 NOTE — TELEPHONE ENCOUNTER
From: Ana Paula Narvaez  To:  Wang Garcias NP  Sent: 8/8/2017 5:07 PM EDT  Subject: Medication Renewal Request    Original authorizing provider: VIC Rondon would like a refill of the following medications:  hydroCHLOROthiazide (HYDRODIURIL) 12.5 mg tablet Wang Garcias NP]    Preferred pharmacy: [address]:

## 2017-08-11 ENCOUNTER — OFFICE VISIT (OUTPATIENT)
Dept: ONCOLOGY | Age: 57
End: 2017-08-11

## 2017-08-11 ENCOUNTER — HOSPITAL ENCOUNTER (OUTPATIENT)
Dept: INFUSION THERAPY | Age: 57
Discharge: HOME | End: 2017-08-11
Payer: COMMERCIAL

## 2017-08-11 VITALS
TEMPERATURE: 98 F | DIASTOLIC BLOOD PRESSURE: 84 MMHG | HEART RATE: 77 BPM | RESPIRATION RATE: 18 BRPM | SYSTOLIC BLOOD PRESSURE: 133 MMHG

## 2017-08-11 VITALS
HEIGHT: 70 IN | WEIGHT: 184.7 LBS | OXYGEN SATURATION: 98 % | DIASTOLIC BLOOD PRESSURE: 86 MMHG | TEMPERATURE: 97.5 F | HEART RATE: 80 BPM | RESPIRATION RATE: 16 BRPM | BODY MASS INDEX: 26.44 KG/M2 | SYSTOLIC BLOOD PRESSURE: 138 MMHG

## 2017-08-11 DIAGNOSIS — C7A.8 NEUROENDOCRINE CARCINOMA OF SMALL BOWEL (HCC): Primary | ICD-10-CM

## 2017-08-11 DIAGNOSIS — K76.9 LESION OF LIVER: ICD-10-CM

## 2017-08-11 DIAGNOSIS — R19.7 DIARRHEA, UNSPECIFIED TYPE: ICD-10-CM

## 2017-08-11 LAB
ALBUMIN SERPL BCP-MCNC: 4.1 G/DL (ref 3.5–5)
ALBUMIN/GLOB SERPL: 1.1 {RATIO} (ref 1.1–2.2)
ALP SERPL-CCNC: 104 U/L (ref 45–117)
ALT SERPL-CCNC: 51 U/L (ref 12–78)
ANION GAP BLD CALC-SCNC: 8 MMOL/L (ref 5–15)
AST SERPL W P-5'-P-CCNC: 36 U/L (ref 15–37)
BASOPHILS # BLD AUTO: 0 K/UL (ref 0–0.1)
BASOPHILS # BLD: 0 % (ref 0–1)
BILIRUB SERPL-MCNC: 0.4 MG/DL (ref 0.2–1)
BUN SERPL-MCNC: 14 MG/DL (ref 6–20)
BUN/CREAT SERPL: 24 (ref 12–20)
CALCIUM SERPL-MCNC: 9.3 MG/DL (ref 8.5–10.1)
CHLORIDE SERPL-SCNC: 100 MMOL/L (ref 97–108)
CO2 SERPL-SCNC: 28 MMOL/L (ref 21–32)
CREAT SERPL-MCNC: 0.59 MG/DL (ref 0.55–1.02)
EOSINOPHIL # BLD: 0.1 K/UL (ref 0–0.4)
EOSINOPHIL NFR BLD: 1 % (ref 0–7)
ERYTHROCYTE [DISTWIDTH] IN BLOOD BY AUTOMATED COUNT: 12.9 % (ref 11.5–14.5)
GLOBULIN SER CALC-MCNC: 3.9 G/DL (ref 2–4)
GLUCOSE SERPL-MCNC: 95 MG/DL (ref 65–100)
HCT VFR BLD AUTO: 38.4 % (ref 35–47)
HGB BLD-MCNC: 13.1 G/DL (ref 11.5–16)
LYMPHOCYTES # BLD AUTO: 20 % (ref 12–49)
LYMPHOCYTES # BLD: 1.3 K/UL (ref 0.8–3.5)
MCH RBC QN AUTO: 30.5 PG (ref 26–34)
MCHC RBC AUTO-ENTMCNC: 34.1 G/DL (ref 30–36.5)
MCV RBC AUTO: 89.3 FL (ref 80–99)
MONOCYTES # BLD: 0.7 K/UL (ref 0–1)
MONOCYTES NFR BLD AUTO: 11 % (ref 5–13)
NEUTS SEG # BLD: 4.3 K/UL (ref 1.8–8)
NEUTS SEG NFR BLD AUTO: 68 % (ref 32–75)
PLATELET # BLD AUTO: 148 K/UL (ref 150–400)
POTASSIUM SERPL-SCNC: 3.9 MMOL/L (ref 3.5–5.1)
PROT SERPL-MCNC: 8 G/DL (ref 6.4–8.2)
RBC # BLD AUTO: 4.3 M/UL (ref 3.8–5.2)
SODIUM SERPL-SCNC: 136 MMOL/L (ref 136–145)
WBC # BLD AUTO: 6.3 K/UL (ref 3.6–11)

## 2017-08-11 PROCEDURE — 96402 CHEMO HORMON ANTINEOPL SQ/IM: CPT

## 2017-08-11 PROCEDURE — 74011250636 HC RX REV CODE- 250/636: Performed by: NURSE PRACTITIONER

## 2017-08-11 PROCEDURE — 85025 COMPLETE CBC W/AUTO DIFF WBC: CPT | Performed by: NURSE PRACTITIONER

## 2017-08-11 PROCEDURE — 80053 COMPREHEN METABOLIC PANEL: CPT | Performed by: NURSE PRACTITIONER

## 2017-08-11 PROCEDURE — 36415 COLL VENOUS BLD VENIPUNCTURE: CPT | Performed by: NURSE PRACTITIONER

## 2017-08-11 RX ORDER — ONDANSETRON HYDROCHLORIDE 8 MG/1
TABLET, FILM COATED ORAL
Refills: 0 | COMMUNITY
Start: 2017-05-11 | End: 2018-01-01

## 2017-08-11 RX ADMIN — LANREOTIDE ACETATE 120 MG: 120 INJECTION SUBCUTANEOUS at 13:44

## 2017-08-11 NOTE — PROGRESS NOTES
Hematology/Oncology Progress Note    REASON FOR VISIT: Neuroendocrine cancer    Establish care after Dr. Towana Koyanagi departure    HISTORY OF PRESENT ILLNESS: Ms. Mikey Tom is a 56 y.o. 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 She had been having recurrent abdominal pain. She ultimately had a CT scan of her abdomen. While she had cholecystitis which was contributing to a lot of her symptoms she was found to have a mesenteric mass. She underwent cholecystectomy and resection of small bowel mass on 7/21/15. Intraoperatively it was noted that that there were tumor deposits in the mesentery and peritoneal surface. These deposits were not biopsied due to logistical reasons/access. She has seen Dr. Shama Dixon at [address] [place] [address] who recommended surgery. Saw [facility] who recommended observation, but confirmed metastatic disease using DOTATE PET scan. She also saw Dr. Makayla Atkinson in [place], [place]. The imaging she had in [place] and [facility] confirmed disease in her liver. (MRI in [place] on 10/5/15 revealed multiple hypervascular liver lesions compatible with liver metastases.)     Lanreotide was started on 1/29/16. Presents for follow-up while on Lanreotide injection. She is a half marathon , running would drop her BP. She is now on half a pill of of HCTZ with excellent BP. Flushing happens as she gets closer to the next lanreotide. Had some nausea and fatigue during treatment. Appetite has been stable. Last PRRT was 7/4/17    Still on Xermelo. Bowels are moving well. Otherwise, ROS is per the symptom report form which has been scanned into the media section of the electronic medical record.     Past Medical History:   Diagnosis Date    Abdominal mass 7/2015    Carcinoid tumor     Gallstones     Hypertension     Neuroendocrine carcinoma of small bowel ([facility] [address] 8/3/2015       Past Surgical History:   Procedure Laterality Date    HX ABDOMINAL LAPAROSCOPY  7/2015 Carcinoid tumor    HX CATARACT REMOVAL Bilateral     HX COLONOSCOPY  AUG 2012    HX GYN      vaginal DELIVERIES  x2    HX HEENT      LASIK    HX ORTHOPAEDIC Left 1979    RELOCATION [facility]       No Known Allergies    Current Outpatient Prescriptions   Medication Sig Dispense Refill    ondansetron hcl (ZOFRAN) 8 mg tablet TAKE 1 TABLET BY MOUTH EVERY 8 HOURS AS NEEDED FOR NAUSEA  0    hydroCHLOROthiazide (HYDRODIURIL) 12.5 mg tablet Take 1 Tab by mouth daily. 30 Tab 5    XERMELO 250 mg tab       VIT C/E/ZN/COPPR/LUTEIN/ZEAXAN (PRESERVISION AREDS 2 PO) Take  by mouth.  lanreotide (SOMATULINE DEPOT) 120 mg/0.5 mL injection syringe 120 mg by SubCUTAneous route every twenty-eight (28) days.  MULTIVITAMIN PO Take  by mouth daily. Social History     Social History    Marital status:      Spouse name: N/A    Number of children: N/A    Years of education: N/A     Social History Main Topics    Smoking status: Never Smoker    Smokeless tobacco: Never Used    Alcohol use 0.6 oz/week     1 Glasses of wine per week      Comment: RARE WINE    Drug use: No    Sexual activity: Yes     Partners: Male     Other Topics Concern    None     Social History Narrative       Family History   Problem Relation Age of Onset   [facility] Cancer Mother      unsure which type    Cancer Maternal Grandfather     Anesth Problems Neg Hx        ROS  As per the HPI, otherwise a comprehensive ROS is negative. ECOG PS is 0. Emotional well being addressed and patient is coping well.     Physical Examination:   Visit Vitals    /86 (BP 1 Location: Left arm, BP Patient Position: Sitting)    Pulse 80    Temp 97.5 °F (36.4 °C) (Oral)    Resp 16    Ht 5' 10\" (1.778 m)    Wt 184 lb 11.2 oz (83.8 kg)    SpO2 98%    BMI 26.5 kg/m2     General appearance - alert, well appearing, and in no distress  Mental status - oriented to person, place, and time  Mouth - mucous membranes moist, pharynx normal without lesions  Neck - supple  Chest - clear to auscultation, no wheezes, rales or rhonchi, symmetric air entry  Heart - normal rate, regular rhythm, normal S1, S2, no murmurs, rubs, clicks or gallops  Abdomen -soft, nontender, nondistended,bowel sounds present  Neurological - normal speech, no focal findings or movement disorder noted  Musculoskeletal - no joint tenderness, deformity or swelling, equal strength in upper and lower extremities  Extremities - peripheral pulses normal, no pedal edema, no clubbing or cyanosis  Skin - normal coloration and turgor, no rashes, no suspicious skin lesions noted    LABS  Lab Results   Component Value Date/Time    WBC 6.3 08/11/2017 01:18 PM    HGB 13.1 08/11/2017 01:18 PM    HCT 38.4 08/11/2017 01:18 PM    PLATELET 028 26/70/2696 01:18 PM    MCV 89.3 08/11/2017 01:18 PM    ABS. NEUTROPHILS 4.3 08/11/2017 01:18 PM     Lab Results   Component Value Date/Time    Sodium 136 08/11/2017 01:18 PM    Potassium 3.9 08/11/2017 01:18 PM    Chloride 100 08/11/2017 01:18 PM    CO2 28 08/11/2017 01:18 PM    Glucose 95 08/11/2017 01:18 PM    BUN 14 08/11/2017 01:18 PM    Creatinine 0.59 08/11/2017 01:18 PM    GFR est AA >60 08/11/2017 01:18 PM    GFR est non-AA >60 08/11/2017 01:18 PM    Calcium 9.3 08/11/2017 01:18 PM     Lab Results   Component Value Date/Time    AST (SGOT) 36 08/11/2017 01:18 PM    Alk. phosphatase 104 08/11/2017 01:18 PM    Protein, total 8.0 08/11/2017 01:18 PM    Albumin 4.1 08/11/2017 01:18 PM    Globulin 3.9 08/11/2017 01:18 PM    A-G Ratio 1.1 08/11/2017 01:18 PM       PATHOLOGY  Small bowel resection on 7/21/15 ([id number]) with a 2.5cm well differentiated neuroendocrine cancer (carcinoid tumor) with 4 of 8 nodes positive. Margins negative. Pathologic stage T3N1. On IHC the cells were positive for synaptophysin, chromogranin, focally positive for pancytokeratin and CK 20.      IMAGING    CT abdomen on 7/2/15 with stellate shaped mass seen in the right lower quadrant in the mesentery series 2 image 56 measuring 2.7 x   1.5 x 1.7 cm. There are fingerlike projections extending from this lesion to the small bowel which appears to be tethered. Questionable wall thickening seen in the loop of small bowel in the right lower quadrant. Multiple small nodules/lymph nodes seen in the mesentery, many of which are surrounding the stellate shaped mass. CT and MRI March 2017 was stable per outside reports  ASSESSMENT  Ms. Anuja Aguilar is a 56 y.o. 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 DISCUSSION/PLAN  1. Neuroendocrine cancer. Doing well on Lanrotide. Underwent treatment with PRRT in clinical trial in [place], . [address] in May. Will continue this for 4 total treatments. Scans per Dr. Hayley Gramajo  She is going to [place] on Aug 30th, received 3/4 treatments, scans on Aug 31 of PRRT on Sep 1    Monitor labs monthly, chromogranin every 3 months. Continue Lanreotide as ordered, next due 9/8    2. Superior Mesenteric Thrombosis. Continue aspirin. 3. Diarrhea. Stopped Lomotil. Continue Xermelo, great response to this medication.      4. Periods of hypotension/presyncopal.Improved on HCTZ alone      Follow up in 1 month, thereafter every other months    Sam Levy MD

## 2017-08-11 NOTE — PROGRESS NOTES
Name: Dolly Smalls    MRN: [id number]         : 1960    Patient arrived to clinic forLabs and LANREOTIDE injection. Labs drawn and processed patient tolerated well. Visit Vitals    /84 (BP 1 Location: Left arm, BP Patient Position: Sitting)    Pulse 77    Temp 98 °F (36.7 °C)    Resp 18    Breastfeeding No       Recent Results (from the past 12 hour(s))   CBC WITH AUTOMATED DIFF    Collection Time: 17  1:18 PM   Result Value Ref Range    WBC 6.3 3.6 - 11.0 K/uL    RBC 4.30 3.80 - 5.20 M/uL    HGB 13.1 11.5 - 16.0 g/dL    HCT 38.4 35.0 - 47.0 %    MCV 89.3 80.0 - 99.0 FL    MCH 30.5 26.0 - 34.0 PG    MCHC 34.1 30.0 - 36.5 g/dL    RDW 12.9 11.5 - 14.5 %    PLATELET 957 (L) 338 - 400 K/uL    NEUTROPHILS 68 32 - 75 %    LYMPHOCYTES 20 12 - 49 %    MONOCYTES 11 5 - 13 %    EOSINOPHILS 1 0 - 7 %    BASOPHILS 0 0 - 1 %    ABS. NEUTROPHILS 4.3 1.8 - 8.0 K/UL    ABS. LYMPHOCYTES 1.3 0.8 - 3.5 K/UL    ABS. MONOCYTES 0.7 0.0 - 1.0 K/UL    ABS. EOSINOPHILS 0.1 0.0 - 0.4 K/UL    ABS. BASOPHILS 0.0 0.0 - 0.1 K/UL   METABOLIC PANEL, COMPREHENSIVE    Collection Time: 17  1:18 PM   Result Value Ref Range    Sodium 136 136 - 145 mmol/L    Potassium 3.9 3.5 - 5.1 mmol/L    Chloride 100 97 - 108 mmol/L    CO2 28 21 - 32 mmol/L    Anion gap 8 5 - 15 mmol/L    Glucose 95 65 - 100 mg/dL    BUN 14 6 - 20 MG/DL    Creatinine 0.59 0.55 - 1.02 MG/DL    BUN/Creatinine ratio 24 (H) 12 - 20      GFR est AA >60 >60 ml/min/1.73m2    GFR est non-AA >60 >60 ml/min/1.73m2    Calcium 9.3 8.5 - 10.1 MG/DL    Bilirubin, total 0.4 0.2 - 1.0 MG/DL    ALT (SGPT) 51 12 - 78 U/L    AST (SGOT) 36 15 - 37 U/L    Alk. phosphatase 104 45 - 117 U/L    Protein, total 8.0 6.4 - 8.2 g/dL    Albumin 4.1 3.5 - 5.0 g/dL    Globulin 3.9 2.0 - 4.0 g/dL    A-G Ratio 1.1 1.1 - 2.2          Medications received  lanreotide (SOMATULINE DEPOT) injection syringe 120 mg given to the right buttocks.       1345: Patient discharged from the [facility] in stable condition    Kaley Melendez RN  August 11, 2017

## 2017-09-05 RX ORDER — LANREOTIDE ACETATE 120 MG/.5ML
120 INJECTION SUBCUTANEOUS ONCE
Status: COMPLETED | OUTPATIENT
Start: 2017-09-08 | End: 2017-09-08

## 2017-09-07 ENCOUNTER — TELEPHONE (OUTPATIENT)
Dept: ONCOLOGY | Age: 57
End: 2017-09-07

## 2017-09-07 DIAGNOSIS — C7A.8 NEUROENDOCRINE CARCINOMA OF SMALL BOWEL (HCC): Primary | ICD-10-CM

## 2017-09-07 DIAGNOSIS — R06.09 DOE (DYSPNEA ON EXERTION): ICD-10-CM

## 2017-09-07 NOTE — TELEPHONE ENCOUNTER
Call to [facility]ia with need for ECHO 9/8/17 between 2-4p. Per [facility] is closed and will call our office at 8:30 a regarding appt. Note to provider.

## 2017-09-08 ENCOUNTER — HOSPITAL ENCOUNTER (OUTPATIENT)
Dept: INFUSION THERAPY | Age: 57
Discharge: HOME | End: 2017-09-08
Payer: COMMERCIAL

## 2017-09-08 ENCOUNTER — TELEPHONE (OUTPATIENT)
Dept: ONCOLOGY | Age: 57
End: 2017-09-08

## 2017-09-08 ENCOUNTER — OFFICE VISIT (OUTPATIENT)
Dept: ONCOLOGY | Age: 57
End: 2017-09-08

## 2017-09-08 ENCOUNTER — HOSPITAL ENCOUNTER (OUTPATIENT)
Dept: NON INVASIVE DIAGNOSTICS | Age: 57
Discharge: HOME | End: 2017-09-08
Attending: NURSE PRACTITIONER
Payer: COMMERCIAL

## 2017-09-08 VITALS
TEMPERATURE: 98.3 F | RESPIRATION RATE: 16 BRPM | OXYGEN SATURATION: 98 % | WEIGHT: 185 LBS | SYSTOLIC BLOOD PRESSURE: 134 MMHG | BODY MASS INDEX: 26.48 KG/M2 | HEIGHT: 70 IN | DIASTOLIC BLOOD PRESSURE: 80 MMHG | HEART RATE: 81 BPM

## 2017-09-08 VITALS
TEMPERATURE: 98 F | RESPIRATION RATE: 16 BRPM | HEART RATE: 87 BPM | SYSTOLIC BLOOD PRESSURE: 128 MMHG | DIASTOLIC BLOOD PRESSURE: 83 MMHG

## 2017-09-08 DIAGNOSIS — C7A.8 NEUROENDOCRINE CARCINOMA OF SMALL BOWEL (HCC): Primary | ICD-10-CM

## 2017-09-08 DIAGNOSIS — C7A.8 NEUROENDOCRINE CARCINOMA OF SMALL BOWEL (HCC): ICD-10-CM

## 2017-09-08 DIAGNOSIS — R19.7 DIARRHEA, UNSPECIFIED TYPE: ICD-10-CM

## 2017-09-08 DIAGNOSIS — R06.09 DOE (DYSPNEA ON EXERTION): ICD-10-CM

## 2017-09-08 LAB
ALBUMIN SERPL-MCNC: 3.6 G/DL (ref 3.5–5)
ALBUMIN/GLOB SERPL: 0.9 {RATIO} (ref 1.1–2.2)
ALP SERPL-CCNC: 95 U/L (ref 45–117)
ALT SERPL-CCNC: 45 U/L (ref 12–78)
ANION GAP SERPL CALC-SCNC: 7 MMOL/L (ref 5–15)
AST SERPL-CCNC: 25 U/L (ref 15–37)
BASOPHILS # BLD: 0 K/UL (ref 0–0.1)
BASOPHILS NFR BLD: 0 % (ref 0–1)
BILIRUB SERPL-MCNC: 0.2 MG/DL (ref 0.2–1)
BUN SERPL-MCNC: 13 MG/DL (ref 6–20)
BUN/CREAT SERPL: 22 (ref 12–20)
CALCIUM SERPL-MCNC: 9 MG/DL (ref 8.5–10.1)
CHLORIDE SERPL-SCNC: 104 MMOL/L (ref 97–108)
CO2 SERPL-SCNC: 30 MMOL/L (ref 21–32)
CREAT SERPL-MCNC: 0.59 MG/DL (ref 0.55–1.02)
EOSINOPHIL # BLD: 0.1 K/UL (ref 0–0.4)
EOSINOPHIL NFR BLD: 1 % (ref 0–7)
ERYTHROCYTE [DISTWIDTH] IN BLOOD BY AUTOMATED COUNT: 13.7 % (ref 11.5–14.5)
GLOBULIN SER CALC-MCNC: 3.9 G/DL (ref 2–4)
GLUCOSE SERPL-MCNC: 96 MG/DL (ref 65–100)
HCT VFR BLD AUTO: 37.7 % (ref 35–47)
HGB BLD-MCNC: 12.7 G/DL (ref 11.5–16)
LYMPHOCYTES # BLD: 0.9 K/UL (ref 0.8–3.5)
LYMPHOCYTES NFR BLD: 19 % (ref 12–49)
MCH RBC QN AUTO: 29.6 PG (ref 26–34)
MCHC RBC AUTO-ENTMCNC: 33.7 G/DL (ref 30–36.5)
MCV RBC AUTO: 87.9 FL (ref 80–99)
MONOCYTES # BLD: 0.5 K/UL (ref 0–1)
MONOCYTES NFR BLD: 10 % (ref 5–13)
NEUTS SEG # BLD: 3.1 K/UL (ref 1.8–8)
NEUTS SEG NFR BLD: 70 % (ref 32–75)
PLATELET # BLD AUTO: 168 K/UL (ref 150–400)
POTASSIUM SERPL-SCNC: 4.1 MMOL/L (ref 3.5–5.1)
PROT SERPL-MCNC: 7.5 G/DL (ref 6.4–8.2)
RBC # BLD AUTO: 4.29 M/UL (ref 3.8–5.2)
SODIUM SERPL-SCNC: 141 MMOL/L (ref 136–145)
WBC # BLD AUTO: 4.4 K/UL (ref 3.6–11)

## 2017-09-08 PROCEDURE — 36415 COLL VENOUS BLD VENIPUNCTURE: CPT | Performed by: NURSE PRACTITIONER

## 2017-09-08 PROCEDURE — 93306 TTE W/DOPPLER COMPLETE: CPT

## 2017-09-08 PROCEDURE — 96372 THER/PROPH/DIAG INJ SC/IM: CPT

## 2017-09-08 PROCEDURE — 74011250636 HC RX REV CODE- 250/636: Performed by: NURSE PRACTITIONER

## 2017-09-08 PROCEDURE — 85025 COMPLETE CBC W/AUTO DIFF WBC: CPT | Performed by: NURSE PRACTITIONER

## 2017-09-08 PROCEDURE — 80053 COMPREHEN METABOLIC PANEL: CPT | Performed by: NURSE PRACTITIONER

## 2017-09-08 RX ORDER — PANCRELIPASE 36000; 180000; 114000 [USP'U]/1; [USP'U]/1; [USP'U]/1
CAPSULE, DELAYED RELEASE PELLETS ORAL
Refills: 6 | COMMUNITY
Start: 2017-08-31 | End: 2017-11-03 | Stop reason: SDUPTHER

## 2017-09-08 RX ADMIN — LANREOTIDE ACETATE 120 MG: 120 INJECTION SUBCUTANEOUS at 17:20

## 2017-09-08 NOTE — PROGRESS NOTES
Johnnie Yono is a 56 y.o. female here today for Neuroendocrine cancer f/u. Patient denies pain. Patient stated the doctor in [place] gave her a script for Creon and she took 2 of them and they caused he to have terrible abdominal pain; patient stopped taking the medication because she wants to ask NP about the Creon.

## 2017-09-08 NOTE — TELEPHONE ENCOUNTER
Jacqueline Healy called for Rich Estrella in regards to this patient. She can be reached at [phone]. Thank you!

## 2017-09-08 NOTE — TELEPHONE ENCOUNTER
Laura Khanna called to speak with someone about this patient's appointment today. Insurance is denying it. The phone is [phone] to call and give more info to get it approved. Thank you!

## 2017-09-08 NOTE — PROGRESS NOTES
[facility]fusion [facility] Short Visit Progress Note    1700 Pt admit to [facility] for labs and Lanreotide ambulatory in stable condition. Assessment completed. No new concerns voiced. Labs drawn peripherally and sent for processing. Please review pending lab results in [address] [place] [address]. Patient Vitals for the past 12 hrs:   Temp Pulse Resp BP   09/08/17 1705 98 °F (36.7 °C) 87 16 128/83       Medications:  Lanreotide SQ left glute    1725 Pt tolerated treatment well. D/c home ambulatory in no distress. Pt aware of next appointment scheduled for 10/6/17.

## 2017-09-26 NOTE — PROGRESS NOTES
Hematology/Oncology Progress Note    REASON FOR VISIT: Neuroendocrine cancer    Establish care after Dr. Ralph Malone departure    HISTORY OF PRESENT ILLNESS: Ms. Serene Godwin is a 56 y.o. 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 She had been having recurrent abdominal pain. She ultimately had a CT scan of her abdomen. While she had cholecystitis which was contributing to a lot of her symptoms she was found to have a mesenteric mass. She underwent cholecystectomy and resection of small bowel mass on 7/21/15. Intraoperatively it was noted that that there were tumor deposits in the mesentery and peritoneal surface. These deposits were not biopsied due to logistical reasons/access. She has seen Dr. Tanner Santoyo at [address] [place] [address] who recommended surgery. Saw [facility] who recommended observation, but confirmed metastatic disease using DOTATE PET scan. She also saw Dr. Amy Morley in [place], [place]. The imaging she had in [place] and [facility] confirmed disease in her liver. (MRI in [place] on 10/5/15 revealed multiple hypervascular liver lesions compatible with liver metastases.)     Lanreotide was started on 1/29/16. Presents for follow-up while on Lanreotide injection. Had a visit in [place] with scans. This showed some growth in liver lesions, but not severe. Overall consider to be stable disease. She is tolerating therapy well. Last treatment without issue. No pain currently. Flushing continues intermittently. No nausea or vomiting. BP has been better controlled. Not as many drops in BP, she is monitoring this frequently. No fever or chills in the home. Bowels are about the same. Otherwise, ROS is per the symptom report form which has been scanned into the media section of the electronic medical record.     Past Medical History:   Diagnosis Date    Abdominal mass 7/2015    Carcinoid tumor     Gallstones     Hypertension     Neuroendocrine carcinoma of small bowel ([facility] [address] 8/3/2015       Past Surgical History:   Procedure Laterality Date    HX ABDOMINAL LAPAROSCOPY  7/2015    Carcinoid tumor    HX CATARACT REMOVAL Bilateral     HX COLONOSCOPY  AUG 2012    HX GYN      vaginal DELIVERIES  x2    HX HEENT      LASIK    HX ORTHOPAEDIC Left 1979    RELOCATION [facility]       Allergies   Allergen Reactions    Epinephrine Unknown (comments)       Current Outpatient Prescriptions   Medication Sig Dispense Refill    CREON 36,000-114,000- 180,000 unit cpDR TAKE ONE CAPSULE BY MOUTH 3 TIMES A DAY WITH MEALS  6    ondansetron hcl (ZOFRAN) 8 mg tablet TAKE 1 TABLET BY MOUTH EVERY 8 HOURS AS NEEDED FOR NAUSEA  0    hydroCHLOROthiazide (HYDRODIURIL) 12.5 mg tablet Take 1 Tab by mouth daily. 30 Tab 5    XERMELO 250 mg tab       VIT C/E/ZN/COPPR/LUTEIN/ZEAXAN (PRESERVISION AREDS 2 PO) Take  by mouth.  lanreotide (SOMATULINE DEPOT) 120 mg/0.5 mL injection syringe 120 mg by SubCUTAneous route every twenty-eight (28) days.  MULTIVITAMIN PO Take  by mouth daily. Social History     Social History    Marital status:      Spouse name: N/A    Number of children: N/A    Years of education: N/A     Social History Main Topics    Smoking status: Never Smoker    Smokeless tobacco: Never Used    Alcohol use 0.6 oz/week     1 Glasses of wine per week      Comment: RARE WINE    Drug use: No    Sexual activity: Yes     Partners: Male     Other Topics Concern    None     Social History Narrative       Family History   Problem Relation Age of Onset   [facility] Cancer Mother      unsure which type    Cancer Maternal Grandfather     Anesth Problems Neg Hx        ROS  As per the HPI, otherwise a comprehensive ROS is negative. ECOG PS is 0. Emotional well being addressed and patient is coping well.     Physical Examination:   Visit Vitals    /80 (BP 1 Location: Left arm, BP Patient Position: Sitting)    Pulse 81    Temp 98.3 °F (36.8 °C) (Oral)    Resp 16    Ht 5' 10\" (1.778 m)    Wt 185 lb (83.9 kg)    SpO2 98%    BMI 26.54 kg/m2     General appearance - alert, well appearing, and in no distress  Mental status - oriented to person, place, and time  Mouth - mucous membranes moist, pharynx normal without lesions  Neck - supple  Chest - clear to auscultation, no wheezes, rales or rhonchi, symmetric air entry  Heart - normal rate, regular rhythm, normal S1, S2, no murmurs, rubs, clicks or gallops  Abdomen -soft, nontender, nondistended,bowel sounds present  Neurological - normal speech, no focal findings or movement disorder noted  Musculoskeletal - no joint tenderness, deformity or swelling, equal strength in upper and lower extremities  Extremities - peripheral pulses normal, no pedal edema, no clubbing or cyanosis  Skin - normal coloration and turgor, no rashes, no suspicious skin lesions noted    LABS  Lab Results   Component Value Date/Time    WBC 4.4 09/08/2017 05:15 PM    HGB 12.7 09/08/2017 05:15 PM    HCT 37.7 09/08/2017 05:15 PM    PLATELET 456 99/96/8411 05:15 PM    MCV 87.9 09/08/2017 05:15 PM    ABS. NEUTROPHILS 3.1 09/08/2017 05:15 PM     Lab Results   Component Value Date/Time    Sodium 141 09/08/2017 05:15 PM    Potassium 4.1 09/08/2017 05:15 PM    Chloride 104 09/08/2017 05:15 PM    CO2 30 09/08/2017 05:15 PM    Glucose 96 09/08/2017 05:15 PM    BUN 13 09/08/2017 05:15 PM    Creatinine 0.59 09/08/2017 05:15 PM    GFR est AA >60 09/08/2017 05:15 PM    GFR est non-AA >60 09/08/2017 05:15 PM    Calcium 9.0 09/08/2017 05:15 PM     Lab Results   Component Value Date/Time    AST (SGOT) 25 09/08/2017 05:15 PM    Alk. phosphatase 95 09/08/2017 05:15 PM    Protein, total 7.5 09/08/2017 05:15 PM    Albumin 3.6 09/08/2017 05:15 PM    Globulin 3.9 09/08/2017 05:15 PM    A-G Ratio 0.9 09/08/2017 05:15 PM       PATHOLOGY  Small bowel resection on 7/21/15 ([id number]) with a 2.5cm well differentiated neuroendocrine cancer (carcinoid tumor) with 4 of 8 nodes positive. Margins negative. Pathologic stage T3N1. On IHC the cells were positive for synaptophysin, chromogranin, focally positive for pancytokeratin and CK 20. IMAGING    CT abdomen on 7/2/15 with stellate shaped mass seen in the right lower quadrant in the mesentery series 2 image 56 measuring 2.7 x   1.5 x 1.7 cm. There are fingerlike projections extending from this lesion to the small bowel which appears to be tethered. Questionable wall thickening seen in the loop of small bowel in the right lower quadrant. Multiple small nodules/lymph nodes seen in the mesentery, many of which are surrounding the stellate shaped mass. CT and MRI March 2017 was stable per outside reports  ASSESSMENT  Ms. Michael Chamberlain is a 56 y.o. 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 DISCUSSION/PLAN  1. Neuroendocrine cancer. Doing well on Lanrotide. Underwent treatment with PRRT in clinical trial in [facility]. [address] in May. Will continue this for 4 total treatments. Scans per Dr. Nayan Batres done following last treatment with some growth, overall stable disease. They are continuing PRRT on trial.     Monitor labs monthly, chromogranin every 3 months. Continue Lanreotide as ordered. 2. Superior Mesenteric Thrombosis. Continue aspirin. 3. Diarrhea. Stopped Lomotil. Continue Xermelo, great response to this medication. Lomotil use PRN, especially with travel/activity. Attempted Creon and had increased abdominal pain. She will try again but lower dosage, if same symptoms will stop. 4. Periods of hypotension/presyncopal. Improved on HCTZ alone, refilled this for 90 day supply.      Follow up in 2 months    Ana Maria Green, NP

## 2017-09-28 ENCOUNTER — TELEPHONE (OUTPATIENT)
Dept: ONCOLOGY | Age: 57
End: 2017-09-28

## 2017-09-28 NOTE — TELEPHONE ENCOUNTER
Araceli (rep) called in stated pt needs prior auth for:   lanreotide (SOMATULINE DEPOT) injection syringe 120 mg.  Please c/b to [phone] to initiate prior auth, ask for Janes Slater

## 2017-09-29 NOTE — TELEPHONE ENCOUNTER
Call placed to number provided, attempted to initiate prior auth. They will need to send verification report to our office to initiate prior authorization, once report received, may call [phone] to continue to initiate authorization.

## 2017-10-03 RX ORDER — LANREOTIDE ACETATE 120 MG/.5ML
120 INJECTION SUBCUTANEOUS ONCE
Status: COMPLETED | OUTPATIENT
Start: 2017-10-06 | End: 2017-10-06

## 2017-10-06 ENCOUNTER — HOSPITAL ENCOUNTER (OUTPATIENT)
Dept: INFUSION THERAPY | Age: 57
Discharge: HOME | End: 2017-10-06
Payer: COMMERCIAL

## 2017-10-06 VITALS
TEMPERATURE: 97.8 F | SYSTOLIC BLOOD PRESSURE: 132 MMHG | DIASTOLIC BLOOD PRESSURE: 89 MMHG | RESPIRATION RATE: 18 BRPM | HEART RATE: 75 BPM

## 2017-10-06 LAB
ALBUMIN SERPL-MCNC: 3.5 G/DL (ref 3.5–5)
ALBUMIN/GLOB SERPL: 0.9 {RATIO} (ref 1.1–2.2)
ALP SERPL-CCNC: 92 U/L (ref 45–117)
ALT SERPL-CCNC: 41 U/L (ref 12–78)
ANION GAP SERPL CALC-SCNC: 10 MMOL/L (ref 5–15)
AST SERPL-CCNC: 25 U/L (ref 15–37)
BASOPHILS # BLD: 0 K/UL (ref 0–0.1)
BASOPHILS NFR BLD: 0 % (ref 0–1)
BILIRUB SERPL-MCNC: 0.2 MG/DL (ref 0.2–1)
BUN SERPL-MCNC: 15 MG/DL (ref 6–20)
BUN/CREAT SERPL: 26 (ref 12–20)
CALCIUM SERPL-MCNC: 8.5 MG/DL (ref 8.5–10.1)
CHLORIDE SERPL-SCNC: 105 MMOL/L (ref 97–108)
CO2 SERPL-SCNC: 25 MMOL/L (ref 21–32)
CREAT SERPL-MCNC: 0.57 MG/DL (ref 0.55–1.02)
EOSINOPHIL # BLD: 0 K/UL (ref 0–0.4)
EOSINOPHIL NFR BLD: 1 % (ref 0–7)
ERYTHROCYTE [DISTWIDTH] IN BLOOD BY AUTOMATED COUNT: 14 % (ref 11.5–14.5)
GLOBULIN SER CALC-MCNC: 3.8 G/DL (ref 2–4)
GLUCOSE SERPL-MCNC: 132 MG/DL (ref 65–100)
HCT VFR BLD AUTO: 36.3 % (ref 35–47)
HGB BLD-MCNC: 12.2 G/DL (ref 11.5–16)
LYMPHOCYTES # BLD: 1.1 K/UL (ref 0.8–3.5)
LYMPHOCYTES NFR BLD: 24 % (ref 12–49)
MCH RBC QN AUTO: 29.5 PG (ref 26–34)
MCHC RBC AUTO-ENTMCNC: 33.6 G/DL (ref 30–36.5)
MCV RBC AUTO: 87.7 FL (ref 80–99)
MONOCYTES # BLD: 0.3 K/UL (ref 0–1)
MONOCYTES NFR BLD: 7 % (ref 5–13)
NEUTS SEG # BLD: 3.2 K/UL (ref 1.8–8)
NEUTS SEG NFR BLD: 68 % (ref 32–75)
PLATELET # BLD AUTO: 136 K/UL (ref 150–400)
POTASSIUM SERPL-SCNC: 3.8 MMOL/L (ref 3.5–5.1)
PROT SERPL-MCNC: 7.3 G/DL (ref 6.4–8.2)
RBC # BLD AUTO: 4.14 M/UL (ref 3.8–5.2)
SODIUM SERPL-SCNC: 140 MMOL/L (ref 136–145)
WBC # BLD AUTO: 4.8 K/UL (ref 3.6–11)

## 2017-10-06 PROCEDURE — 74011250636 HC RX REV CODE- 250/636: Performed by: INTERNAL MEDICINE

## 2017-10-06 PROCEDURE — 36415 COLL VENOUS BLD VENIPUNCTURE: CPT | Performed by: NURSE PRACTITIONER

## 2017-10-06 PROCEDURE — 85025 COMPLETE CBC W/AUTO DIFF WBC: CPT | Performed by: NURSE PRACTITIONER

## 2017-10-06 PROCEDURE — 96372 THER/PROPH/DIAG INJ SC/IM: CPT

## 2017-10-06 PROCEDURE — 80053 COMPREHEN METABOLIC PANEL: CPT | Performed by: NURSE PRACTITIONER

## 2017-10-06 RX ADMIN — LANREOTIDE ACETATE 120 MG: 120 INJECTION SUBCUTANEOUS at 13:27

## 2017-10-06 NOTE — PROGRESS NOTES
Outpatient Infusion Center Progress Note    1400 Pt admit to [facility] for labs and Lanreotide ambulatory in stable condition. Assessment completed. No new concerns voiced. Visit Vitals    /89    Pulse 75    Temp 97.8 °F (36.6 °C)    Resp 18    Breastfeeding No       Medications:  Lanreotide SQ right glute    1330 Pt tolerated treatment well. D/c home ambulatory in no distress. Pt aware of next appointment scheduled for 11/3/17    Recent Results (from the past 12 hour(s))   CBC WITH AUTOMATED DIFF    Collection Time: 10/06/17  1:21 PM   Result Value Ref Range    WBC 4.8 3.6 - 11.0 K/uL    RBC 4.14 3.80 - 5.20 M/uL    HGB 12.2 11.5 - 16.0 g/dL    HCT 36.3 35.0 - 47.0 %    MCV 87.7 80.0 - 99.0 FL    MCH 29.5 26.0 - 34.0 PG    MCHC 33.6 30.0 - 36.5 g/dL    RDW 14.0 11.5 - 14.5 %    PLATELET 977 (L) 742 - 400 K/uL    NEUTROPHILS 68 32 - 75 %    LYMPHOCYTES 24 12 - 49 %    MONOCYTES 7 5 - 13 %    EOSINOPHILS 1 0 - 7 %    BASOPHILS 0 0 - 1 %    ABS. NEUTROPHILS 3.2 1.8 - 8.0 K/UL    ABS. LYMPHOCYTES 1.1 0.8 - 3.5 K/UL    ABS. MONOCYTES 0.3 0.0 - 1.0 K/UL    ABS. EOSINOPHILS 0.0 0.0 - 0.4 K/UL    ABS. BASOPHILS 0.0 0.0 - 0.1 K/UL   METABOLIC PANEL, COMPREHENSIVE    Collection Time: 10/06/17  1:21 PM   Result Value Ref Range    Sodium 140 136 - 145 mmol/L    Potassium 3.8 3.5 - 5.1 mmol/L    Chloride 105 97 - 108 mmol/L    CO2 25 21 - 32 mmol/L    Anion gap 10 5 - 15 mmol/L    Glucose 132 (H) 65 - 100 mg/dL    BUN 15 6 - 20 MG/DL    Creatinine 0.57 0.55 - 1.02 MG/DL    BUN/Creatinine ratio 26 (H) 12 - 20      GFR est AA >60 >60 ml/min/1.73m2    GFR est non-AA >60 >60 ml/min/1.73m2    Calcium 8.5 8.5 - 10.1 MG/DL    Bilirubin, total 0.2 0.2 - 1.0 MG/DL    ALT (SGPT) 41 12 - 78 U/L    AST (SGOT) 25 15 - 37 U/L    Alk.  phosphatase 92 45 - 117 U/L    Protein, total 7.3 6.4 - 8.2 g/dL    Albumin 3.5 3.5 - 5.0 g/dL    Globulin 3.8 2.0 - 4.0 g/dL    A-G Ratio 0.9 (L) 1.1 - 2.2

## 2017-10-17 ENCOUNTER — TELEPHONE (OUTPATIENT)
Dept: ONCOLOGY | Age: 57
End: 2017-10-17

## 2017-10-17 NOTE — TELEPHONE ENCOUNTER
Jacquie Singh called on patient's behalf requesting a return call. She would like to know the status of a prior auth for medication  lanreotide (SOMATULINE DEPOT) 120 mg/0.5 mL injection syringe  Please return call to discuss [phone].   qasim

## 2017-10-20 ENCOUNTER — TELEPHONE (OUTPATIENT)
Dept: ONCOLOGY | Age: 57
End: 2017-10-20

## 2017-10-20 NOTE — TELEPHONE ENCOUNTER
Zaida Mead from [place] [address] called on patient's behalf regarding medication XERMELO 250 mg tab. She stated a prior authorization needs to be initiated by our office to her insurance company. Please call [phone] to initiate prior Eduin Murphy, policy #[id number]. If any questions return Sujata's call [phone].   qasim

## 2017-10-23 NOTE — TELEPHONE ENCOUNTER
Call to [facility], spoke with eLidy. Prior authorization for Kimberly Barber approved for the next 12 months. Patient updated on status. Call returned to Tulio Carlson with [employer]. They will contact patient to set up delivery of drug. Thanked for return call.

## 2017-10-23 NOTE — TELEPHONE ENCOUNTER
Patient called stating the pharmacy has not heard anything back regarding the prior authorization. Patient stated the medication runs out tomorrow. Please initiate prior auth today. Patient is requesting a return call regarding this today [phone].

## 2017-10-30 RX ORDER — LANREOTIDE ACETATE 120 MG/.5ML
120 INJECTION SUBCUTANEOUS ONCE
Status: COMPLETED | OUTPATIENT
Start: 2017-11-03 | End: 2017-11-03

## 2017-11-02 ENCOUNTER — DOCUMENTATION ONLY (OUTPATIENT)
Dept: ONCOLOGY | Age: 57
End: 2017-11-02

## 2017-11-03 ENCOUNTER — HOSPITAL ENCOUNTER (OUTPATIENT)
Dept: INFUSION THERAPY | Age: 57
Discharge: HOME | End: 2017-11-03
Payer: COMMERCIAL

## 2017-11-03 ENCOUNTER — OFFICE VISIT (OUTPATIENT)
Dept: ONCOLOGY | Age: 57
End: 2017-11-03

## 2017-11-03 ENCOUNTER — DOCUMENTATION ONLY (OUTPATIENT)
Dept: ONCOLOGY | Age: 57
End: 2017-11-03

## 2017-11-03 VITALS
BODY MASS INDEX: 26.4 KG/M2 | TEMPERATURE: 97.3 F | OXYGEN SATURATION: 97 % | WEIGHT: 184.4 LBS | DIASTOLIC BLOOD PRESSURE: 76 MMHG | SYSTOLIC BLOOD PRESSURE: 116 MMHG | RESPIRATION RATE: 16 BRPM | HEIGHT: 70 IN | HEART RATE: 83 BPM

## 2017-11-03 VITALS
TEMPERATURE: 98.3 F | RESPIRATION RATE: 16 BRPM | HEART RATE: 80 BPM | DIASTOLIC BLOOD PRESSURE: 64 MMHG | SYSTOLIC BLOOD PRESSURE: 135 MMHG

## 2017-11-03 DIAGNOSIS — K76.9 LESION OF LIVER: ICD-10-CM

## 2017-11-03 DIAGNOSIS — I10 HYPERTENSION, UNSPECIFIED TYPE: ICD-10-CM

## 2017-11-03 DIAGNOSIS — C7A.8 NEUROENDOCRINE CARCINOMA OF SMALL BOWEL (HCC): Primary | ICD-10-CM

## 2017-11-03 DIAGNOSIS — R19.7 DIARRHEA, UNSPECIFIED TYPE: ICD-10-CM

## 2017-11-03 LAB
ALBUMIN SERPL-MCNC: 3.8 G/DL (ref 3.5–5)
ALBUMIN/GLOB SERPL: 1.1 {RATIO} (ref 1.1–2.2)
ALP SERPL-CCNC: 94 U/L (ref 45–117)
ALT SERPL-CCNC: 37 U/L (ref 12–78)
ANION GAP SERPL CALC-SCNC: 9 MMOL/L (ref 5–15)
AST SERPL-CCNC: 27 U/L (ref 15–37)
BASOPHILS # BLD: 0 K/UL (ref 0–0.1)
BASOPHILS NFR BLD: 0 % (ref 0–1)
BILIRUB SERPL-MCNC: 0.3 MG/DL (ref 0.2–1)
BUN SERPL-MCNC: 19 MG/DL (ref 6–20)
BUN/CREAT SERPL: 21 (ref 12–20)
CALCIUM SERPL-MCNC: 8.9 MG/DL (ref 8.5–10.1)
CHLORIDE SERPL-SCNC: 104 MMOL/L (ref 97–108)
CO2 SERPL-SCNC: 27 MMOL/L (ref 21–32)
CREAT SERPL-MCNC: 0.91 MG/DL (ref 0.55–1.02)
DIFFERENTIAL METHOD BLD: ABNORMAL
EOSINOPHIL # BLD: 0 K/UL (ref 0–0.4)
EOSINOPHIL NFR BLD: 1 % (ref 0–7)
ERYTHROCYTE [DISTWIDTH] IN BLOOD BY AUTOMATED COUNT: 14.4 % (ref 11.5–14.5)
GLOBULIN SER CALC-MCNC: 3.5 G/DL (ref 2–4)
GLUCOSE SERPL-MCNC: 97 MG/DL (ref 65–100)
HCT VFR BLD AUTO: 36.7 % (ref 35–47)
HGB BLD-MCNC: 12.3 G/DL (ref 11.5–16)
LYMPHOCYTES # BLD: 0.6 K/UL (ref 0.8–3.5)
LYMPHOCYTES NFR BLD: 15 % (ref 12–49)
MCH RBC QN AUTO: 30.1 PG (ref 26–34)
MCHC RBC AUTO-ENTMCNC: 33.5 G/DL (ref 30–36.5)
MCV RBC AUTO: 89.7 FL (ref 80–99)
MONOCYTES # BLD: 0.6 K/UL (ref 0–1)
MONOCYTES NFR BLD: 14 % (ref 5–13)
NEUTS SEG # BLD: 2.8 K/UL (ref 1.8–8)
NEUTS SEG NFR BLD: 70 % (ref 32–75)
PLATELET # BLD AUTO: 147 K/UL (ref 150–400)
POTASSIUM SERPL-SCNC: 4.4 MMOL/L (ref 3.5–5.1)
PROT SERPL-MCNC: 7.3 G/DL (ref 6.4–8.2)
RBC # BLD AUTO: 4.09 M/UL (ref 3.8–5.2)
RBC MORPH BLD: ABNORMAL
SODIUM SERPL-SCNC: 140 MMOL/L (ref 136–145)
WBC # BLD AUTO: 4 K/UL (ref 3.6–11)

## 2017-11-03 PROCEDURE — 74011250636 HC RX REV CODE- 250/636: Performed by: NURSE PRACTITIONER

## 2017-11-03 PROCEDURE — 85025 COMPLETE CBC W/AUTO DIFF WBC: CPT | Performed by: NURSE PRACTITIONER

## 2017-11-03 PROCEDURE — 80053 COMPREHEN METABOLIC PANEL: CPT | Performed by: NURSE PRACTITIONER

## 2017-11-03 PROCEDURE — 86316 IMMUNOASSAY TUMOR OTHER: CPT | Performed by: NURSE PRACTITIONER

## 2017-11-03 PROCEDURE — 36415 COLL VENOUS BLD VENIPUNCTURE: CPT | Performed by: NURSE PRACTITIONER

## 2017-11-03 PROCEDURE — 96401 CHEMO ANTI-NEOPL SQ/IM: CPT

## 2017-11-03 RX ORDER — HYDROCHLOROTHIAZIDE 12.5 MG/1
12.5 TABLET ORAL DAILY
Qty: 90 TAB | Refills: 3 | Status: SHIPPED | OUTPATIENT
Start: 2017-11-03 | End: 2018-01-01 | Stop reason: SDUPTHER

## 2017-11-03 RX ORDER — PANCRELIPASE 36000; 180000; 114000 [USP'U]/1; [USP'U]/1; [USP'U]/1
1 CAPSULE, DELAYED RELEASE PELLETS ORAL 3 TIMES DAILY
Qty: 270 CAP | Refills: 3 | Status: SHIPPED | OUTPATIENT
Start: 2017-11-03 | End: 2018-01-01 | Stop reason: SDUPTHER

## 2017-11-03 RX ADMIN — LANREOTIDE ACETATE 120 MG: 120 INJECTION SUBCUTANEOUS at 13:29

## 2017-11-03 NOTE — PROGRESS NOTES
Hematology/Oncology Progress Note    REASON FOR VISIT: Neuroendocrine cancer    TREATMENT:   Lanreotide started Jan 2016  Sutent in clinical trial at [facility] on 12/11/2016, stopped due to progression  Started PRRT with Dr. Miri Juarez that completed 4 treatments on 10/27/2017    HISTORY OF PRESENT ILLNESS: Ms. Kaylin Pack is a 56 y.o. 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 She had been having recurrent abdominal pain. She ultimately had a CT scan of her abdomen. While she had cholecystitis which was contributing to a lot of her symptoms she was found to have a mesenteric mass. She underwent cholecystectomy and resection of small bowel mass on 7/21/15. Intraoperatively it was noted that that there were tumor deposits in the mesentery and peritoneal surface. These deposits were not biopsied due to logistical reasons/access. She has seen Dr. Virgilio Dawson at [address] [place] [address] who recommended surgery. Saw [facility] who recommended observation, but confirmed metastatic disease using DOTATE PET scan. She also saw Dr. Loly Larios in [place], [place]. The imaging she had in [place] and [facility] confirmed disease in her liver. (MRI in [place] on 10/5/15 revealed multiple hypervascular liver lesions compatible with liver metastases.)     Lanreotide was started on 1/29/16. Presents for follow-up while on Lanreotide injection. Diarrhea continues intermittently. Worse last week, but better this past week. Taking Xermelo three times daily. Started Creon three times daily that has improved symptoms overall. Was only taking this once daily. This has improved abdominal cramping and pain. Hot flashes continue about 5-8 per day. No fever or chills. No weight change. Appetite has been stable. No nausea or vomiting. No swelling of extremities. Blood pressure has been stable. Taking HCTZ in the evening now. Seen by Dr. Miri Juarez to complete PRRT last week.  Scans done there with some concern for growth in liver lesions, however this thought to be due to PRRT treatments. Plan to rescan again in 3 months. Otherwise, ROS is per the symptom report form which has been scanned into the media section of the electronic medical record. Past Medical History:   Diagnosis Date    Abdominal mass 7/2015    Carcinoid tumor     Gallstones     Hypertension     Neuroendocrine carcinoma of small bowel ([facility] [address] 8/3/2015       Past Surgical History:   Procedure Laterality Date    HX ABDOMINAL LAPAROSCOPY  7/2015    Carcinoid tumor    HX CATARACT REMOVAL Bilateral     HX COLONOSCOPY  AUG 2012    HX GYN      vaginal DELIVERIES  x2    HX HEENT      LASIK    HX ORTHOPAEDIC Left 1979    RELOCATION [facility]       Allergies   Allergen Reactions    Epinephrine Unknown (comments)       Current Outpatient Prescriptions   Medication Sig Dispense Refill    CREON 36,000-114,000- 180,000 unit cpDR TAKE ONE CAPSULE BY MOUTH 3 TIMES A DAY WITH MEALS  6    hydroCHLOROthiazide (HYDRODIURIL) 12.5 mg tablet Take 1 Tab by mouth daily. 30 Tab 5    XERMELO 250 mg tab       VIT C/E/ZN/COPPR/LUTEIN/ZEAXAN (PRESERVISION AREDS 2 PO) Take  by mouth.  lanreotide (SOMATULINE DEPOT) 120 mg/0.5 mL injection syringe 120 mg by SubCUTAneous route every twenty-eight (28) days.  MULTIVITAMIN PO Take  by mouth daily.       ondansetron hcl (ZOFRAN) 8 mg tablet TAKE 1 TABLET BY MOUTH EVERY 8 HOURS AS NEEDED FOR NAUSEA  0       Social History     Social History    Marital status:      Spouse name: N/A    Number of children: N/A    Years of education: N/A     Social History Main Topics    Smoking status: Never Smoker    Smokeless tobacco: Never Used    Alcohol use 0.6 oz/week     1 Glasses of wine per week      Comment: RARE WINE    Drug use: No    Sexual activity: Yes     Partners: Male     Other Topics Concern    None     Social History Narrative       Family History   Problem Relation Age of Onset    Cancer Mother unsure which type    Cancer Maternal Grandfather     Anesth Problems Neg Hx        ROS  As per the HPI, otherwise a comprehensive ROS is negative. ECOG PS is 0. Emotional well being addressed and patient is coping well. Physical Examination:   Visit Vitals    /76    Pulse 83    Temp 97.3 °F (36.3 °C)    Resp 16    Ht 5' 10\" (1.778 m)    Wt 184 lb 6.4 oz (83.6 kg)    SpO2 97%    BMI 26.46 kg/m2     General appearance - alert, well appearing, and in no distress  Mental status - oriented to person, place, and time  Mouth - mucous membranes moist, pharynx normal without lesions  Neck - supple  Chest - clear to auscultation, no wheezes, rales or rhonchi, symmetric air entry  Heart - normal rate, regular rhythm, normal S1, S2, no murmurs, rubs, clicks or gallops  Abdomen -soft, nontender, nondistended,bowel sounds present  Neurological - normal speech, no focal findings or movement disorder noted  Musculoskeletal - no joint tenderness, deformity or swelling, equal strength in upper and lower extremities  Extremities - peripheral pulses normal, no pedal edema, no clubbing or cyanosis  Skin - normal coloration and turgor, no rashes, no suspicious skin lesions noted    LABS  Lab Results   Component Value Date/Time    WBC 4.8 10/06/2017 01:21 PM    HGB 12.2 10/06/2017 01:21 PM    HCT 36.3 10/06/2017 01:21 PM    PLATELET 865 67/44/9474 01:21 PM    MCV 87.7 10/06/2017 01:21 PM    ABS. NEUTROPHILS 3.2 10/06/2017 01:21 PM     Lab Results   Component Value Date/Time    Sodium 140 10/06/2017 01:21 PM    Potassium 3.8 10/06/2017 01:21 PM    Chloride 105 10/06/2017 01:21 PM    CO2 25 10/06/2017 01:21 PM    Glucose 132 10/06/2017 01:21 PM    BUN 15 10/06/2017 01:21 PM    Creatinine 0.57 10/06/2017 01:21 PM    GFR est AA >60 10/06/2017 01:21 PM    GFR est non-AA >60 10/06/2017 01:21 PM    Calcium 8.5 10/06/2017 01:21 PM     Lab Results   Component Value Date/Time    AST (SGOT) 25 10/06/2017 01:21 PM    Alk. phosphatase 92 10/06/2017 01:21 PM    Protein, total 7.3 10/06/2017 01:21 PM    Albumin 3.5 10/06/2017 01:21 PM    Globulin 3.8 10/06/2017 01:21 PM    A-G Ratio 0.9 10/06/2017 01:21 PM       PATHOLOGY  Small bowel resection on 7/21/15 ([id number]) with a 2.5cm well differentiated neuroendocrine cancer (carcinoid tumor) with 4 of 8 nodes positive. Margins negative. Pathologic stage T3N1. On IHC the cells were positive for synaptophysin, chromogranin, focally positive for pancytokeratin and CK 20. IMAGING    CT abdomen on 7/2/15 with stellate shaped mass seen in the right lower quadrant in the mesentery series 2 image 56 measuring 2.7 x   1.5 x 1.7 cm. There are fingerlike projections extending from this lesion to the small bowel which appears to be tethered. Questionable wall thickening seen in the loop of small bowel in the right lower quadrant. Multiple small nodules/lymph nodes seen in the mesentery, many of which are surrounding the stellate shaped mass. CT and MRI March 2017 was stable per outside reports    ASSESSMENT  Ms. Spencer Sellers is a 56 y.o. 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 DISCUSSION/PLAN  1. Neuroendocrine cancer. Doing well on Lanrotide. Underwent treatment with PRRT in clinical trial in [facility]. [address] in May. Will continue this for 4 total treatments. Scans per Dr. Barrera Number done following last treatment with some growth, overall stable disease. PRRT has completed now, last treatment last week. Monitor labs monthly, chromogranin every 3 months. Continue Lanreotide as ordered. MRI in 3 months with Dr. Sandra Ayala. [facility]-follow up as needed. 2. Superior Mesenteric Thrombosis. Continue aspirin. 3. Diarrhea. Stopped Lomotil. Continue Xermelo, great response to this medication. Lomotil use PRN, especially with travel/activity. Creon to continue three times daily with meals. Refill for 90 days supply.      4. Periods of hypotension/presyncopal. Improved on HCTZ alone, refilled this for 90 day supply.      Follow up in 2 months    Patient was seen with Yvonne Young MD

## 2017-11-03 NOTE — PROGRESS NOTES
Outpatient Infusion Center - Chemotherapy Progress Note    1310 Pt admit to [facility] for Lanreotide/labs ambulatory in stable condition accompanied by spouse. Assessment completed. No new concerns voiced. Labs drawn peripherally and sent for processing. Visit Vitals    /64    Pulse 80    Temp 98.3 °F (36.8 °C)    Resp 16    Breastfeeding No       Medications:  Lanreotide (SC L glute)    1335 Pt tolerated treatment well. D/c home ambulatory in no distress. Pt aware of next [facility] appointment scheduled for 12/01/2017. Please review labs in CC once resulted.

## 2017-11-03 NOTE — PROGRESS NOTES
[facility] enrollment form signed by provider and patient, faxed complete to [facility]. To [facility] for scanning.

## 2017-11-08 LAB — CGA SERPL-SCNC: 71 NMOL/L (ref 0–5)

## 2017-11-13 PROBLEM — I10 HYPERTENSION: Status: ACTIVE | Noted: 2017-11-13

## 2017-11-28 RX ORDER — LANREOTIDE ACETATE 120 MG/.5ML
120 INJECTION SUBCUTANEOUS ONCE
Status: COMPLETED | OUTPATIENT
Start: 2017-12-01 | End: 2017-12-01

## 2017-12-01 ENCOUNTER — HOSPITAL ENCOUNTER (OUTPATIENT)
Dept: INFUSION THERAPY | Age: 57
Discharge: HOME | End: 2017-12-01
Payer: COMMERCIAL

## 2017-12-01 VITALS
DIASTOLIC BLOOD PRESSURE: 77 MMHG | SYSTOLIC BLOOD PRESSURE: 132 MMHG | HEART RATE: 79 BPM | TEMPERATURE: 98.4 F | RESPIRATION RATE: 16 BRPM

## 2017-12-01 LAB
ALBUMIN SERPL-MCNC: 3.5 G/DL (ref 3.5–5)
ALBUMIN/GLOB SERPL: 0.9 {RATIO} (ref 1.1–2.2)
ALP SERPL-CCNC: 99 U/L (ref 45–117)
ALT SERPL-CCNC: 38 U/L (ref 12–78)
ANION GAP SERPL CALC-SCNC: 8 MMOL/L (ref 5–15)
AST SERPL-CCNC: 24 U/L (ref 15–37)
BASOPHILS # BLD: 0 K/UL (ref 0–0.1)
BASOPHILS NFR BLD: 1 % (ref 0–1)
BILIRUB SERPL-MCNC: 0.3 MG/DL (ref 0.2–1)
BUN SERPL-MCNC: 21 MG/DL (ref 6–20)
BUN/CREAT SERPL: 31 (ref 12–20)
CALCIUM SERPL-MCNC: 9 MG/DL (ref 8.5–10.1)
CHLORIDE SERPL-SCNC: 104 MMOL/L (ref 97–108)
CO2 SERPL-SCNC: 28 MMOL/L (ref 21–32)
CREAT SERPL-MCNC: 0.68 MG/DL (ref 0.55–1.02)
DIFFERENTIAL METHOD BLD: ABNORMAL
EOSINOPHIL # BLD: 0 K/UL (ref 0–0.4)
EOSINOPHIL NFR BLD: 1 % (ref 0–7)
ERYTHROCYTE [DISTWIDTH] IN BLOOD BY AUTOMATED COUNT: 14.1 % (ref 11.5–14.5)
GLOBULIN SER CALC-MCNC: 4.1 G/DL (ref 2–4)
GLUCOSE SERPL-MCNC: 200 MG/DL (ref 65–100)
HCT VFR BLD AUTO: 35.2 % (ref 35–47)
HGB BLD-MCNC: 11.7 G/DL (ref 11.5–16)
LYMPHOCYTES # BLD: 0.7 K/UL (ref 0.8–3.5)
LYMPHOCYTES NFR BLD: 17 % (ref 12–49)
MCH RBC QN AUTO: 29.9 PG (ref 26–34)
MCHC RBC AUTO-ENTMCNC: 33.2 G/DL (ref 30–36.5)
MCV RBC AUTO: 90 FL (ref 80–99)
MONOCYTES # BLD: 0.5 K/UL (ref 0–1)
MONOCYTES NFR BLD: 14 % (ref 5–13)
NEUTS SEG # BLD: 2.7 K/UL (ref 1.8–8)
NEUTS SEG NFR BLD: 67 % (ref 32–75)
PLATELET # BLD AUTO: 115 K/UL (ref 150–400)
POTASSIUM SERPL-SCNC: 4 MMOL/L (ref 3.5–5.1)
PROT SERPL-MCNC: 7.6 G/DL (ref 6.4–8.2)
RBC # BLD AUTO: 3.91 M/UL (ref 3.8–5.2)
RBC MORPH BLD: ABNORMAL
SODIUM SERPL-SCNC: 140 MMOL/L (ref 136–145)
WBC # BLD AUTO: 3.9 K/UL (ref 3.6–11)

## 2017-12-01 PROCEDURE — 36415 COLL VENOUS BLD VENIPUNCTURE: CPT | Performed by: NURSE PRACTITIONER

## 2017-12-01 PROCEDURE — 74011250636 HC RX REV CODE- 250/636: Performed by: NURSE PRACTITIONER

## 2017-12-01 PROCEDURE — 85025 COMPLETE CBC W/AUTO DIFF WBC: CPT | Performed by: NURSE PRACTITIONER

## 2017-12-01 PROCEDURE — 80053 COMPREHEN METABOLIC PANEL: CPT | Performed by: NURSE PRACTITIONER

## 2017-12-01 PROCEDURE — 96402 CHEMO HORMON ANTINEOPL SQ/IM: CPT

## 2017-12-01 RX ADMIN — LANREOTIDE ACETATE 120 MG: 120 INJECTION SUBCUTANEOUS at 13:28

## 2017-12-01 NOTE — PROGRESS NOTES
Outpatient Infusion Center Progress Note    1320 Pt admit to [address] [place] [address] for labs/Lanreotide injection. Pt ambulatory in stable condition. Assessment completed. No new concerns voiced. Labs drawn peripherally as ordered and sent for processing. Recent Results (from the past 12 hour(s))   CBC WITH AUTOMATED DIFF    Collection Time: 12/01/17  1:27 PM   Result Value Ref Range    WBC 3.9 3.6 - 11.0 K/uL    RBC 3.91 3.80 - 5.20 M/uL    HGB 11.7 11.5 - 16.0 g/dL    HCT 35.2 35.0 - 47.0 %    MCV 90.0 80.0 - 99.0 FL    MCH 29.9 26.0 - 34.0 PG    MCHC 33.2 30.0 - 36.5 g/dL    RDW 14.1 11.5 - 14.5 %    PLATELET 145 (L) 951 - 400 K/uL    NEUTROPHILS 67 32 - 75 %    LYMPHOCYTES 17 12 - 49 %    MONOCYTES 14 (H) 5 - 13 %    EOSINOPHILS 1 0 - 7 %    BASOPHILS 1 0 - 1 %    ABS. NEUTROPHILS 2.7 1.8 - 8.0 K/UL    ABS. LYMPHOCYTES 0.7 (L) 0.8 - 3.5 K/UL    ABS. MONOCYTES 0.5 0.0 - 1.0 K/UL    ABS. EOSINOPHILS 0.0 0.0 - 0.4 K/UL    ABS. BASOPHILS 0.0 0.0 - 0.1 K/UL    DF SMEAR SCANNED      RBC COMMENTS OVALOCYTES  PRESENT       METABOLIC PANEL, COMPREHENSIVE    Collection Time: 12/01/17  1:27 PM   Result Value Ref Range    Sodium 140 136 - 145 mmol/L    Potassium 4.0 3.5 - 5.1 mmol/L    Chloride 104 97 - 108 mmol/L    CO2 28 21 - 32 mmol/L    Anion gap 8 5 - 15 mmol/L    Glucose 200 (H) 65 - 100 mg/dL    BUN 21 (H) 6 - 20 MG/DL    Creatinine 0.68 0.55 - 1.02 MG/DL    BUN/Creatinine ratio 31 (H) 12 - 20      GFR est AA >60 >60 ml/min/1.73m2    GFR est non-AA >60 >60 ml/min/1.73m2    Calcium 9.0 8.5 - 10.1 MG/DL    Bilirubin, total 0.3 0.2 - 1.0 MG/DL    ALT (SGPT) 38 12 - 78 U/L    AST (SGOT) 24 15 - 37 U/L    Alk.  phosphatase 99 45 - 117 U/L    Protein, total 7.6 6.4 - 8.2 g/dL    Albumin 3.5 3.5 - 5.0 g/dL    Globulin 4.1 (H) 2.0 - 4.0 g/dL    A-G Ratio 0.9 (L) 1.1 - 2.2           Visit Vitals    /77    Pulse 79    Temp 98.4 °F (36.9 °C)    Resp 16    Breastfeeding No       Medications:  Lanreotide-IM-right glute    1330 Pt tolerated treatment well. D/c home ambulatory in no distress. Pt aware of next appointment scheduled for 12/29/17 at 1300.

## 2017-12-29 NOTE — PROGRESS NOTES
1325 Pt admit to [place] for Lanreotide/Labs ambulatory in stable condition. Assessment completed. No new concerns voiced. Labs drawn peripherally and sent for processing. Visit Vitals    /87    Pulse 79    Temp 97.8 °F (36.6 °C)    Resp 16       Medications:  Lanreotide SQ Left GM    1335 Pt tolerated treatment well. D/c home ambulatory in no distress. Pt aware of next appointment scheduled for 1/26/18. Recent Results (from the past 12 hour(s))   CBC WITH AUTOMATED DIFF    Collection Time: 12/29/17  1:28 PM   Result Value Ref Range    WBC 3.8 3.6 - 11.0 K/uL    RBC 4.15 3.80 - 5.20 M/uL    HGB 12.4 11.5 - 16.0 g/dL    HCT 37.7 35.0 - 47.0 %    MCV 90.8 80.0 - 99.0 FL    MCH 29.9 26.0 - 34.0 PG    MCHC 32.9 30.0 - 36.5 g/dL    RDW 14.0 11.5 - 14.5 %    PLATELET 933 (L) 329 - 400 K/uL    NEUTROPHILS 63 32 - 75 %    LYMPHOCYTES 22 12 - 49 %    MONOCYTES 14 (H) 5 - 13 %    EOSINOPHILS 1 0 - 7 %    BASOPHILS 0 0 - 1 %    ABS. NEUTROPHILS 2.4 1.8 - 8.0 K/UL    ABS. LYMPHOCYTES 0.8 0.8 - 3.5 K/UL    ABS. MONOCYTES 0.5 0.0 - 1.0 K/UL    ABS. EOSINOPHILS 0.0 0.0 - 0.4 K/UL    ABS. BASOPHILS 0.0 0.0 - 0.1 K/UL   METABOLIC PANEL, COMPREHENSIVE    Collection Time: 12/29/17  1:28 PM   Result Value Ref Range    Sodium 140 136 - 145 mmol/L    Potassium 4.2 3.5 - 5.1 mmol/L    Chloride 104 97 - 108 mmol/L    CO2 29 21 - 32 mmol/L    Anion gap 7 5 - 15 mmol/L    Glucose 82 65 - 100 mg/dL    BUN 14 6 - 20 MG/DL    Creatinine 0.55 0.55 - 1.02 MG/DL    BUN/Creatinine ratio 25 (H) 12 - 20      GFR est AA >60 >60 ml/min/1.73m2    GFR est non-AA >60 >60 ml/min/1.73m2    Calcium 9.0 8.5 - 10.1 MG/DL    Bilirubin, total 0.3 0.2 - 1.0 MG/DL    ALT (SGPT) 40 12 - 78 U/L    AST (SGOT) 31 15 - 37 U/L    Alk.  phosphatase 99 45 - 117 U/L    Protein, total 7.4 6.4 - 8.2 g/dL    Albumin 3.9 3.5 - 5.0 g/dL    Globulin 3.5 2.0 - 4.0 g/dL    A-G Ratio 1.1 1.1 - 2.2

## 2018-01-01 ENCOUNTER — HOSPITAL ENCOUNTER (OUTPATIENT)
Dept: INFUSION THERAPY | Age: 58
Discharge: HOME | End: 2018-07-13
Payer: COMMERCIAL

## 2018-01-01 ENCOUNTER — HOSPITAL ENCOUNTER (OUTPATIENT)
Dept: INFUSION THERAPY | Age: 58
Discharge: HOME | End: 2018-11-02
Payer: COMMERCIAL

## 2018-01-01 ENCOUNTER — HOSPITAL ENCOUNTER (OUTPATIENT)
Dept: INFUSION THERAPY | Age: 58
Discharge: HOME | End: 2018-03-23
Payer: COMMERCIAL

## 2018-01-01 ENCOUNTER — TELEPHONE (OUTPATIENT)
Dept: CARDIOLOGY CLINIC | Age: 58
End: 2018-01-01

## 2018-01-01 ENCOUNTER — TELEPHONE (OUTPATIENT)
Dept: ONCOLOGY | Age: 58
End: 2018-01-01

## 2018-01-01 ENCOUNTER — PATIENT MESSAGE (OUTPATIENT)
Dept: ONCOLOGY | Age: 58
End: 2018-01-01

## 2018-01-01 ENCOUNTER — OFFICE VISIT (OUTPATIENT)
Dept: ONCOLOGY | Age: 58
End: 2018-01-01

## 2018-01-01 ENCOUNTER — HOSPITAL ENCOUNTER (OUTPATIENT)
Dept: INFUSION THERAPY | Age: 58
Discharge: HOME | End: 2018-08-10
Payer: COMMERCIAL

## 2018-01-01 ENCOUNTER — HOSPITAL ENCOUNTER (OUTPATIENT)
Dept: INFUSION THERAPY | Age: 58
Discharge: HOME | End: 2018-05-18
Payer: COMMERCIAL

## 2018-01-01 ENCOUNTER — DOCUMENTATION ONLY (OUTPATIENT)
Dept: ONCOLOGY | Age: 58
End: 2018-01-01

## 2018-01-01 ENCOUNTER — HOSPITAL ENCOUNTER (OUTPATIENT)
Dept: INFUSION THERAPY | Age: 58
Discharge: HOME | End: 2018-02-24
Payer: COMMERCIAL

## 2018-01-01 ENCOUNTER — HOSPITAL ENCOUNTER (OUTPATIENT)
Dept: NON INVASIVE DIAGNOSTICS | Age: 58
Discharge: HOME | End: 2018-08-10
Attending: NURSE PRACTITIONER
Payer: COMMERCIAL

## 2018-01-01 ENCOUNTER — HOSPITAL ENCOUNTER (OUTPATIENT)
Dept: MAMMOGRAPHY | Age: 58
Discharge: HOME | End: 2018-03-08

## 2018-01-01 ENCOUNTER — HOSPITAL ENCOUNTER (OUTPATIENT)
Dept: INFUSION THERAPY | Age: 58
Discharge: HOME | End: 2018-06-15
Payer: COMMERCIAL

## 2018-01-01 ENCOUNTER — HOSPITAL ENCOUNTER (OUTPATIENT)
Dept: INFUSION THERAPY | Age: 58
Discharge: HOME | End: 2018-11-30
Payer: COMMERCIAL

## 2018-01-01 ENCOUNTER — HOSPITAL ENCOUNTER (OUTPATIENT)
Dept: NON INVASIVE DIAGNOSTICS | Age: 58
Discharge: HOME | End: 2018-05-21
Attending: NURSE PRACTITIONER
Payer: COMMERCIAL

## 2018-01-01 ENCOUNTER — HOSPITAL ENCOUNTER (OUTPATIENT)
Dept: INFUSION THERAPY | Age: 58
Discharge: HOME | End: 2018-04-20
Payer: COMMERCIAL

## 2018-01-01 ENCOUNTER — HOSPITAL ENCOUNTER (OUTPATIENT)
Dept: INFUSION THERAPY | Age: 58
Discharge: HOME | End: 2018-09-07
Payer: COMMERCIAL

## 2018-01-01 ENCOUNTER — OFFICE VISIT (OUTPATIENT)
Dept: CARDIOLOGY CLINIC | Age: 58
End: 2018-01-01

## 2018-01-01 ENCOUNTER — HOSPITAL ENCOUNTER (OUTPATIENT)
Dept: INFUSION THERAPY | Age: 58
Discharge: HOME | End: 2018-10-05
Payer: COMMERCIAL

## 2018-01-01 ENCOUNTER — HOSPITAL ENCOUNTER (OUTPATIENT)
Dept: INFUSION THERAPY | Age: 58
Discharge: HOME | End: 2018-02-23
Payer: COMMERCIAL

## 2018-01-01 ENCOUNTER — HOSPITAL ENCOUNTER (OUTPATIENT)
Dept: INFUSION THERAPY | Age: 58
Discharge: HOME | End: 2018-01-26
Payer: COMMERCIAL

## 2018-01-01 VITALS
HEART RATE: 78 BPM | DIASTOLIC BLOOD PRESSURE: 78 MMHG | HEART RATE: 95 BPM | RESPIRATION RATE: 18 BRPM | TEMPERATURE: 97.6 F | SYSTOLIC BLOOD PRESSURE: 126 MMHG | RESPIRATION RATE: 18 BRPM | SYSTOLIC BLOOD PRESSURE: 131 MMHG | TEMPERATURE: 97.2 F | DIASTOLIC BLOOD PRESSURE: 83 MMHG

## 2018-01-01 VITALS
BODY MASS INDEX: 24.6 KG/M2 | SYSTOLIC BLOOD PRESSURE: 131 MMHG | TEMPERATURE: 98 F | DIASTOLIC BLOOD PRESSURE: 84 MMHG | OXYGEN SATURATION: 98 % | HEART RATE: 78 BPM | RESPIRATION RATE: 20 BRPM | WEIGHT: 171.8 LBS | HEIGHT: 70 IN

## 2018-01-01 VITALS
HEIGHT: 70 IN | OXYGEN SATURATION: 97 % | RESPIRATION RATE: 18 BRPM | RESPIRATION RATE: 20 BRPM | WEIGHT: 181.4 LBS | SYSTOLIC BLOOD PRESSURE: 145 MMHG | HEART RATE: 84 BPM | DIASTOLIC BLOOD PRESSURE: 79 MMHG | HEART RATE: 74 BPM | SYSTOLIC BLOOD PRESSURE: 136 MMHG | BODY MASS INDEX: 25.97 KG/M2 | TEMPERATURE: 97.7 F | DIASTOLIC BLOOD PRESSURE: 86 MMHG | TEMPERATURE: 98.7 F

## 2018-01-01 VITALS
RESPIRATION RATE: 16 BRPM | WEIGHT: 179 LBS | TEMPERATURE: 97.8 F | HEART RATE: 84 BPM | BODY MASS INDEX: 25.62 KG/M2 | HEIGHT: 70 IN | DIASTOLIC BLOOD PRESSURE: 80 MMHG | OXYGEN SATURATION: 98 % | SYSTOLIC BLOOD PRESSURE: 138 MMHG

## 2018-01-01 VITALS
RESPIRATION RATE: 18 BRPM | TEMPERATURE: 97.8 F | SYSTOLIC BLOOD PRESSURE: 140 MMHG | HEART RATE: 81 BPM | DIASTOLIC BLOOD PRESSURE: 86 MMHG

## 2018-01-01 VITALS
TEMPERATURE: 97.4 F | HEART RATE: 90 BPM | SYSTOLIC BLOOD PRESSURE: 126 MMHG | DIASTOLIC BLOOD PRESSURE: 81 MMHG | RESPIRATION RATE: 18 BRPM

## 2018-01-01 VITALS
WEIGHT: 160 LBS | TEMPERATURE: 98.1 F | SYSTOLIC BLOOD PRESSURE: 130 MMHG | HEART RATE: 87 BPM | HEIGHT: 70 IN | RESPIRATION RATE: 16 BRPM | DIASTOLIC BLOOD PRESSURE: 85 MMHG | OXYGEN SATURATION: 97 % | BODY MASS INDEX: 22.9 KG/M2

## 2018-01-01 VITALS
DIASTOLIC BLOOD PRESSURE: 81 MMHG | HEART RATE: 88 BPM | TEMPERATURE: 96.9 F | SYSTOLIC BLOOD PRESSURE: 147 MMHG | RESPIRATION RATE: 18 BRPM

## 2018-01-01 VITALS
DIASTOLIC BLOOD PRESSURE: 84 MMHG | SYSTOLIC BLOOD PRESSURE: 131 MMHG | HEART RATE: 76 BPM | RESPIRATION RATE: 18 BRPM | TEMPERATURE: 97 F

## 2018-01-01 VITALS
RESPIRATION RATE: 16 BRPM | OXYGEN SATURATION: 99 % | DIASTOLIC BLOOD PRESSURE: 80 MMHG | SYSTOLIC BLOOD PRESSURE: 138 MMHG | HEART RATE: 77 BPM | HEIGHT: 70 IN | WEIGHT: 187.2 LBS | TEMPERATURE: 97.6 F | BODY MASS INDEX: 26.8 KG/M2

## 2018-01-01 VITALS
DIASTOLIC BLOOD PRESSURE: 76 MMHG | SYSTOLIC BLOOD PRESSURE: 130 MMHG | RESPIRATION RATE: 18 BRPM | HEART RATE: 81 BPM | TEMPERATURE: 97.9 F

## 2018-01-01 VITALS
HEART RATE: 80 BPM | RESPIRATION RATE: 18 BRPM | WEIGHT: 170.2 LBS | DIASTOLIC BLOOD PRESSURE: 88 MMHG | SYSTOLIC BLOOD PRESSURE: 143 MMHG | BODY MASS INDEX: 24.42 KG/M2 | TEMPERATURE: 98 F

## 2018-01-01 VITALS
TEMPERATURE: 98 F | DIASTOLIC BLOOD PRESSURE: 83 MMHG | HEART RATE: 98 BPM | RESPIRATION RATE: 18 BRPM | SYSTOLIC BLOOD PRESSURE: 127 MMHG

## 2018-01-01 VITALS
SYSTOLIC BLOOD PRESSURE: 132 MMHG | TEMPERATURE: 98.2 F | HEART RATE: 82 BPM | DIASTOLIC BLOOD PRESSURE: 86 MMHG | RESPIRATION RATE: 18 BRPM

## 2018-01-01 VITALS
SYSTOLIC BLOOD PRESSURE: 122 MMHG | HEART RATE: 81 BPM | BODY MASS INDEX: 25.2 KG/M2 | HEIGHT: 70 IN | WEIGHT: 176 LBS | DIASTOLIC BLOOD PRESSURE: 80 MMHG

## 2018-01-01 VITALS
OXYGEN SATURATION: 97 % | HEIGHT: 70 IN | DIASTOLIC BLOOD PRESSURE: 75 MMHG | BODY MASS INDEX: 25.48 KG/M2 | HEART RATE: 88 BPM | RESPIRATION RATE: 20 BRPM | TEMPERATURE: 98.2 F | SYSTOLIC BLOOD PRESSURE: 116 MMHG | WEIGHT: 178 LBS

## 2018-01-01 DIAGNOSIS — C7A.8 NEUROENDOCRINE CARCINOMA OF SMALL BOWEL (HCC): Primary | ICD-10-CM

## 2018-01-01 DIAGNOSIS — R19.7 DIARRHEA, UNSPECIFIED TYPE: ICD-10-CM

## 2018-01-01 DIAGNOSIS — E34.0 CARCINOID SYNDROME (HCC): Primary | ICD-10-CM

## 2018-01-01 DIAGNOSIS — C7A.8 NEUROENDOCRINE CARCINOMA OF SMALL BOWEL (HCC): ICD-10-CM

## 2018-01-01 DIAGNOSIS — Z12.31 ENCOUNTER FOR SCREENING MAMMOGRAM FOR MALIGNANT NEOPLASM OF BREAST: ICD-10-CM

## 2018-01-01 DIAGNOSIS — I10 ESSENTIAL HYPERTENSION: Primary | ICD-10-CM

## 2018-01-01 DIAGNOSIS — E34.0 CARCINOID SYNDROME (HCC): ICD-10-CM

## 2018-01-01 DIAGNOSIS — I10 ESSENTIAL HYPERTENSION: ICD-10-CM

## 2018-01-01 DIAGNOSIS — M79.89 SWELLING OF LOWER EXTREMITY: ICD-10-CM

## 2018-01-01 DIAGNOSIS — K76.9 LESION OF LIVER: ICD-10-CM

## 2018-01-01 DIAGNOSIS — R06.09 DOE (DYSPNEA ON EXERTION): ICD-10-CM

## 2018-01-01 DIAGNOSIS — R60.9 EDEMA, UNSPECIFIED TYPE: ICD-10-CM

## 2018-01-01 DIAGNOSIS — R00.2 PALPITATIONS: ICD-10-CM

## 2018-01-01 DIAGNOSIS — R06.02 SOB (SHORTNESS OF BREATH): Primary | ICD-10-CM

## 2018-01-01 LAB
ALBUMIN SERPL-MCNC: 2.4 G/DL (ref 3.5–5)
ALBUMIN SERPL-MCNC: 2.8 G/DL (ref 3.5–5)
ALBUMIN SERPL-MCNC: 3.4 G/DL (ref 3.5–5)
ALBUMIN SERPL-MCNC: 3.5 G/DL (ref 3.5–5)
ALBUMIN SERPL-MCNC: 3.5 G/DL (ref 3.5–5)
ALBUMIN SERPL-MCNC: 3.7 G/DL (ref 3.5–5)
ALBUMIN SERPL-MCNC: 3.9 G/DL (ref 3.5–5)
ALBUMIN SERPL-MCNC: 4 G/DL (ref 3.5–5)
ALBUMIN/GLOB SERPL: 0.6 {RATIO} (ref 1.1–2.2)
ALBUMIN/GLOB SERPL: 0.7 {RATIO} (ref 1.1–2.2)
ALBUMIN/GLOB SERPL: 0.8 {RATIO} (ref 1.1–2.2)
ALBUMIN/GLOB SERPL: 0.9 {RATIO} (ref 1.1–2.2)
ALP SERPL-CCNC: 104 U/L (ref 45–117)
ALP SERPL-CCNC: 105 U/L (ref 45–117)
ALP SERPL-CCNC: 109 U/L (ref 45–117)
ALP SERPL-CCNC: 114 U/L (ref 45–117)
ALP SERPL-CCNC: 116 U/L (ref 45–117)
ALP SERPL-CCNC: 119 U/L (ref 45–117)
ALP SERPL-CCNC: 129 U/L (ref 45–117)
ALP SERPL-CCNC: 140 U/L (ref 45–117)
ALP SERPL-CCNC: 150 U/L (ref 45–117)
ALP SERPL-CCNC: 152 U/L (ref 45–117)
ALP SERPL-CCNC: 188 U/L (ref 45–117)
ALP SERPL-CCNC: 224 U/L (ref 45–117)
ALT SERPL-CCNC: 27 U/L (ref 12–78)
ALT SERPL-CCNC: 30 U/L (ref 12–78)
ALT SERPL-CCNC: 35 U/L (ref 12–78)
ALT SERPL-CCNC: 36 U/L (ref 12–78)
ALT SERPL-CCNC: 37 U/L (ref 12–78)
ALT SERPL-CCNC: 40 U/L (ref 12–78)
ALT SERPL-CCNC: 42 U/L (ref 12–78)
ALT SERPL-CCNC: 42 U/L (ref 12–78)
ALT SERPL-CCNC: 45 U/L (ref 12–78)
ALT SERPL-CCNC: 48 U/L (ref 12–78)
ALT SERPL-CCNC: 51 U/L (ref 12–78)
ALT SERPL-CCNC: 68 U/L (ref 12–78)
ANION GAP SERPL CALC-SCNC: 10 MMOL/L (ref 5–15)
ANION GAP SERPL CALC-SCNC: 10 MMOL/L (ref 5–15)
ANION GAP SERPL CALC-SCNC: 5 MMOL/L (ref 5–15)
ANION GAP SERPL CALC-SCNC: 6 MMOL/L (ref 5–15)
ANION GAP SERPL CALC-SCNC: 6 MMOL/L (ref 5–15)
ANION GAP SERPL CALC-SCNC: 7 MMOL/L (ref 5–15)
ANION GAP SERPL CALC-SCNC: 7 MMOL/L (ref 5–15)
ANION GAP SERPL CALC-SCNC: 8 MMOL/L (ref 5–15)
ANION GAP SERPL CALC-SCNC: 9 MMOL/L (ref 5–15)
ANION GAP SERPL CALC-SCNC: 9 MMOL/L (ref 5–15)
AST SERPL-CCNC: 20 U/L (ref 15–37)
AST SERPL-CCNC: 24 U/L (ref 15–37)
AST SERPL-CCNC: 25 U/L (ref 15–37)
AST SERPL-CCNC: 26 U/L (ref 15–37)
AST SERPL-CCNC: 26 U/L (ref 15–37)
AST SERPL-CCNC: 28 U/L (ref 15–37)
AST SERPL-CCNC: 29 U/L (ref 15–37)
AST SERPL-CCNC: 29 U/L (ref 15–37)
AST SERPL-CCNC: 30 U/L (ref 15–37)
AST SERPL-CCNC: 33 U/L (ref 15–37)
AST SERPL-CCNC: 34 U/L (ref 15–37)
AST SERPL-CCNC: 45 U/L (ref 15–37)
BASOPHILS # BLD: 0 K/UL (ref 0–0.1)
BASOPHILS NFR BLD: 0 % (ref 0–1)
BASOPHILS NFR BLD: 1 % (ref 0–1)
BILIRUB SERPL-MCNC: 0.2 MG/DL (ref 0.2–1)
BILIRUB SERPL-MCNC: 0.3 MG/DL (ref 0.2–1)
BILIRUB SERPL-MCNC: 0.4 MG/DL (ref 0.2–1)
BILIRUB SERPL-MCNC: 0.6 MG/DL (ref 0.2–1)
BNP SERPL-MCNC: 36.9 PG/ML (ref 0–100)
BUN SERPL-MCNC: 12 MG/DL (ref 6–20)
BUN SERPL-MCNC: 13 MG/DL (ref 6–20)
BUN SERPL-MCNC: 14 MG/DL (ref 6–20)
BUN SERPL-MCNC: 15 MG/DL (ref 6–20)
BUN SERPL-MCNC: 16 MG/DL (ref 6–20)
BUN SERPL-MCNC: 16 MG/DL (ref 6–20)
BUN SERPL-MCNC: 17 MG/DL (ref 6–20)
BUN SERPL-MCNC: 18 MG/DL (ref 6–20)
BUN/CREAT SERPL: 19 (ref 12–20)
BUN/CREAT SERPL: 20 (ref 12–20)
BUN/CREAT SERPL: 23 (ref 12–20)
BUN/CREAT SERPL: 24 (ref 12–20)
BUN/CREAT SERPL: 25 (ref 12–20)
BUN/CREAT SERPL: 25 (ref 12–20)
BUN/CREAT SERPL: 28 (ref 12–20)
BUN/CREAT SERPL: 37 (ref 12–20)
CALCIUM SERPL-MCNC: 8.3 MG/DL (ref 8.5–10.1)
CALCIUM SERPL-MCNC: 8.4 MG/DL (ref 8.5–10.1)
CALCIUM SERPL-MCNC: 8.7 MG/DL (ref 8.5–10.1)
CALCIUM SERPL-MCNC: 8.9 MG/DL (ref 8.5–10.1)
CALCIUM SERPL-MCNC: 9.1 MG/DL (ref 8.5–10.1)
CALCIUM SERPL-MCNC: 9.2 MG/DL (ref 8.5–10.1)
CALCIUM SERPL-MCNC: 9.3 MG/DL (ref 8.5–10.1)
CALCIUM SERPL-MCNC: 9.3 MG/DL (ref 8.5–10.1)
CALCIUM SERPL-MCNC: 9.5 MG/DL (ref 8.5–10.1)
CGA SERPL-SCNC: 108 NMOL/L (ref 0–5)
CGA SERPL-SCNC: 61 NMOL/L (ref 0–5)
CGA SERPL-SCNC: 64 NMOL/L (ref 0–5)
CGA SERPL-SCNC: 76 NMOL/L (ref 0–5)
CHLORIDE SERPL-SCNC: 100 MMOL/L (ref 97–108)
CHLORIDE SERPL-SCNC: 102 MMOL/L (ref 97–108)
CHLORIDE SERPL-SCNC: 103 MMOL/L (ref 97–108)
CHLORIDE SERPL-SCNC: 103 MMOL/L (ref 97–108)
CHLORIDE SERPL-SCNC: 104 MMOL/L (ref 97–108)
CHLORIDE SERPL-SCNC: 104 MMOL/L (ref 97–108)
CHLORIDE SERPL-SCNC: 105 MMOL/L (ref 97–108)
CHLORIDE SERPL-SCNC: 107 MMOL/L (ref 97–108)
CHLORIDE SERPL-SCNC: 107 MMOL/L (ref 97–108)
CHLORIDE SERPL-SCNC: 98 MMOL/L (ref 97–108)
CO2 SERPL-SCNC: 26 MMOL/L (ref 21–32)
CO2 SERPL-SCNC: 27 MMOL/L (ref 21–32)
CO2 SERPL-SCNC: 28 MMOL/L (ref 21–32)
CO2 SERPL-SCNC: 29 MMOL/L (ref 21–32)
CO2 SERPL-SCNC: 30 MMOL/L (ref 21–32)
CO2 SERPL-SCNC: 32 MMOL/L (ref 21–32)
CREAT SERPL-MCNC: 0.46 MG/DL (ref 0.55–1.02)
CREAT SERPL-MCNC: 0.49 MG/DL (ref 0.55–1.02)
CREAT SERPL-MCNC: 0.53 MG/DL (ref 0.55–1.02)
CREAT SERPL-MCNC: 0.53 MG/DL (ref 0.55–1.02)
CREAT SERPL-MCNC: 0.56 MG/DL (ref 0.55–1.02)
CREAT SERPL-MCNC: 0.61 MG/DL (ref 0.55–1.02)
CREAT SERPL-MCNC: 0.61 MG/DL (ref 0.55–1.02)
CREAT SERPL-MCNC: 0.62 MG/DL (ref 0.55–1.02)
CREAT SERPL-MCNC: 0.65 MG/DL (ref 0.55–1.02)
CREAT SERPL-MCNC: 0.68 MG/DL (ref 0.55–1.02)
CREAT SERPL-MCNC: 0.75 MG/DL (ref 0.55–1.02)
CREAT SERPL-MCNC: 0.78 MG/DL (ref 0.55–1.02)
DIFFERENTIAL METHOD BLD: ABNORMAL
DIFFERENTIAL METHOD BLD: NORMAL
EOSINOPHIL # BLD: 0 K/UL (ref 0–0.4)
EOSINOPHIL # BLD: 0.1 K/UL (ref 0–0.4)
EOSINOPHIL NFR BLD: 1 % (ref 0–7)
EOSINOPHIL NFR BLD: 2 % (ref 0–7)
EOSINOPHIL NFR BLD: 3 % (ref 0–7)
EOSINOPHIL NFR BLD: 3 % (ref 0–7)
ERYTHROCYTE [DISTWIDTH] IN BLOOD BY AUTOMATED COUNT: 13.2 % (ref 11.5–14.5)
ERYTHROCYTE [DISTWIDTH] IN BLOOD BY AUTOMATED COUNT: 13.3 % (ref 11.5–14.5)
ERYTHROCYTE [DISTWIDTH] IN BLOOD BY AUTOMATED COUNT: 13.4 % (ref 11.5–14.5)
ERYTHROCYTE [DISTWIDTH] IN BLOOD BY AUTOMATED COUNT: 13.5 % (ref 11.5–14.5)
ERYTHROCYTE [DISTWIDTH] IN BLOOD BY AUTOMATED COUNT: 13.7 % (ref 11.5–14.5)
ERYTHROCYTE [DISTWIDTH] IN BLOOD BY AUTOMATED COUNT: 14 % (ref 11.5–14.5)
ERYTHROCYTE [DISTWIDTH] IN BLOOD BY AUTOMATED COUNT: 14 % (ref 11.5–14.5)
ERYTHROCYTE [DISTWIDTH] IN BLOOD BY AUTOMATED COUNT: 14.1 % (ref 11.5–14.5)
ERYTHROCYTE [DISTWIDTH] IN BLOOD BY AUTOMATED COUNT: 14.2 % (ref 11.5–14.5)
ERYTHROCYTE [DISTWIDTH] IN BLOOD BY AUTOMATED COUNT: 14.2 % (ref 11.5–14.5)
ERYTHROCYTE [DISTWIDTH] IN BLOOD BY AUTOMATED COUNT: 14.4 % (ref 11.5–14.5)
ERYTHROCYTE [DISTWIDTH] IN BLOOD BY AUTOMATED COUNT: 14.5 % (ref 11.5–14.5)
GLOBULIN SER CALC-MCNC: 3.7 G/DL (ref 2–4)
GLOBULIN SER CALC-MCNC: 3.9 G/DL (ref 2–4)
GLOBULIN SER CALC-MCNC: 4 G/DL (ref 2–4)
GLOBULIN SER CALC-MCNC: 4.1 G/DL (ref 2–4)
GLOBULIN SER CALC-MCNC: 4.1 G/DL (ref 2–4)
GLOBULIN SER CALC-MCNC: 4.2 G/DL (ref 2–4)
GLOBULIN SER CALC-MCNC: 4.3 G/DL (ref 2–4)
GLOBULIN SER CALC-MCNC: 4.3 G/DL (ref 2–4)
GLOBULIN SER CALC-MCNC: 4.5 G/DL (ref 2–4)
GLUCOSE SERPL-MCNC: 105 MG/DL (ref 65–100)
GLUCOSE SERPL-MCNC: 117 MG/DL (ref 65–100)
GLUCOSE SERPL-MCNC: 132 MG/DL (ref 65–100)
GLUCOSE SERPL-MCNC: 168 MG/DL (ref 65–100)
GLUCOSE SERPL-MCNC: 175 MG/DL (ref 65–100)
GLUCOSE SERPL-MCNC: 176 MG/DL (ref 65–100)
GLUCOSE SERPL-MCNC: 238 MG/DL (ref 65–100)
GLUCOSE SERPL-MCNC: 81 MG/DL (ref 65–100)
GLUCOSE SERPL-MCNC: 94 MG/DL (ref 65–100)
GLUCOSE SERPL-MCNC: 96 MG/DL (ref 65–100)
GLUCOSE SERPL-MCNC: 97 MG/DL (ref 65–100)
GLUCOSE SERPL-MCNC: 98 MG/DL (ref 65–100)
HCT VFR BLD AUTO: 30 % (ref 35–47)
HCT VFR BLD AUTO: 32 % (ref 35–47)
HCT VFR BLD AUTO: 35.2 % (ref 35–47)
HCT VFR BLD AUTO: 35.8 % (ref 35–47)
HCT VFR BLD AUTO: 35.8 % (ref 35–47)
HCT VFR BLD AUTO: 36 % (ref 35–47)
HCT VFR BLD AUTO: 36.6 % (ref 35–47)
HCT VFR BLD AUTO: 36.8 % (ref 35–47)
HCT VFR BLD AUTO: 36.9 % (ref 35–47)
HCT VFR BLD AUTO: 36.9 % (ref 35–47)
HCT VFR BLD AUTO: 37.9 % (ref 35–47)
HCT VFR BLD AUTO: 39.8 % (ref 35–47)
HGB BLD-MCNC: 10.1 G/DL (ref 11.5–16)
HGB BLD-MCNC: 11.4 G/DL (ref 11.5–16)
HGB BLD-MCNC: 11.5 G/DL (ref 11.5–16)
HGB BLD-MCNC: 11.5 G/DL (ref 11.5–16)
HGB BLD-MCNC: 11.8 G/DL (ref 11.5–16)
HGB BLD-MCNC: 11.8 G/DL (ref 11.5–16)
HGB BLD-MCNC: 11.9 G/DL (ref 11.5–16)
HGB BLD-MCNC: 12.1 G/DL (ref 11.5–16)
HGB BLD-MCNC: 12.2 G/DL (ref 11.5–16)
HGB BLD-MCNC: 13 G/DL (ref 11.5–16)
HGB BLD-MCNC: 13.2 G/DL (ref 11.5–16)
HGB BLD-MCNC: 9.9 G/DL (ref 11.5–16)
IMM GRANULOCYTES # BLD: 0 K/UL (ref 0–0.04)
IMM GRANULOCYTES # BLD: 0.1 K/UL (ref 0–0.04)
IMM GRANULOCYTES NFR BLD AUTO: 0 % (ref 0–0.5)
IMM GRANULOCYTES NFR BLD AUTO: 1 % (ref 0–0.5)
LYMPHOCYTES # BLD: 0.5 K/UL (ref 0.8–3.5)
LYMPHOCYTES # BLD: 0.8 K/UL (ref 0.8–3.5)
LYMPHOCYTES # BLD: 0.9 K/UL (ref 0.8–3.5)
LYMPHOCYTES # BLD: 1 K/UL (ref 0.8–3.5)
LYMPHOCYTES # BLD: 1.1 K/UL (ref 0.8–3.5)
LYMPHOCYTES # BLD: 1.2 K/UL (ref 0.8–3.5)
LYMPHOCYTES NFR BLD: 20 % (ref 12–49)
LYMPHOCYTES NFR BLD: 20 % (ref 12–49)
LYMPHOCYTES NFR BLD: 21 % (ref 12–49)
LYMPHOCYTES NFR BLD: 23 % (ref 12–49)
LYMPHOCYTES NFR BLD: 25 % (ref 12–49)
LYMPHOCYTES NFR BLD: 26 % (ref 12–49)
LYMPHOCYTES NFR BLD: 28 % (ref 12–49)
LYMPHOCYTES NFR BLD: 8 % (ref 12–49)
MCH RBC QN AUTO: 28.7 PG (ref 26–34)
MCH RBC QN AUTO: 29.3 PG (ref 26–34)
MCH RBC QN AUTO: 29.7 PG (ref 26–34)
MCH RBC QN AUTO: 30 PG (ref 26–34)
MCH RBC QN AUTO: 30 PG (ref 26–34)
MCH RBC QN AUTO: 30.1 PG (ref 26–34)
MCH RBC QN AUTO: 30.1 PG (ref 26–34)
MCH RBC QN AUTO: 30.2 PG (ref 26–34)
MCH RBC QN AUTO: 30.2 PG (ref 26–34)
MCH RBC QN AUTO: 30.3 PG (ref 26–34)
MCH RBC QN AUTO: 30.8 PG (ref 26–34)
MCH RBC QN AUTO: 31.3 PG (ref 26–34)
MCHC RBC AUTO-ENTMCNC: 31.4 G/DL (ref 30–36.5)
MCHC RBC AUTO-ENTMCNC: 31.6 G/DL (ref 30–36.5)
MCHC RBC AUTO-ENTMCNC: 32.1 G/DL (ref 30–36.5)
MCHC RBC AUTO-ENTMCNC: 32.3 G/DL (ref 30–36.5)
MCHC RBC AUTO-ENTMCNC: 32.4 G/DL (ref 30–36.5)
MCHC RBC AUTO-ENTMCNC: 32.8 G/DL (ref 30–36.5)
MCHC RBC AUTO-ENTMCNC: 32.8 G/DL (ref 30–36.5)
MCHC RBC AUTO-ENTMCNC: 33 G/DL (ref 30–36.5)
MCHC RBC AUTO-ENTMCNC: 33 G/DL (ref 30–36.5)
MCHC RBC AUTO-ENTMCNC: 33.1 G/DL (ref 30–36.5)
MCHC RBC AUTO-ENTMCNC: 33.2 G/DL (ref 30–36.5)
MCHC RBC AUTO-ENTMCNC: 34.3 G/DL (ref 30–36.5)
MCV RBC AUTO: 90.9 FL (ref 80–99)
MCV RBC AUTO: 91.1 FL (ref 80–99)
MCV RBC AUTO: 91.1 FL (ref 80–99)
MCV RBC AUTO: 91.3 FL (ref 80–99)
MCV RBC AUTO: 91.8 FL (ref 80–99)
MCV RBC AUTO: 92.3 FL (ref 80–99)
MCV RBC AUTO: 92.8 FL (ref 80–99)
MCV RBC AUTO: 93.1 FL (ref 80–99)
MCV RBC AUTO: 93.2 FL (ref 80–99)
MCV RBC AUTO: 94.1 FL (ref 80–99)
MONOCYTES # BLD: 0.3 K/UL (ref 0–1)
MONOCYTES # BLD: 0.3 K/UL (ref 0–1)
MONOCYTES # BLD: 0.4 K/UL (ref 0–1)
MONOCYTES # BLD: 0.5 K/UL (ref 0–1)
MONOCYTES # BLD: 0.6 K/UL (ref 0–1)
MONOCYTES # BLD: 0.6 K/UL (ref 0–1)
MONOCYTES NFR BLD: 10 % (ref 5–13)
MONOCYTES NFR BLD: 11 % (ref 5–13)
MONOCYTES NFR BLD: 13 % (ref 5–13)
MONOCYTES NFR BLD: 9 % (ref 5–13)
NEUTS SEG # BLD: 2.4 K/UL (ref 1.8–8)
NEUTS SEG # BLD: 2.4 K/UL (ref 1.8–8)
NEUTS SEG # BLD: 2.5 K/UL (ref 1.8–8)
NEUTS SEG # BLD: 2.5 K/UL (ref 1.8–8)
NEUTS SEG # BLD: 2.6 K/UL (ref 1.8–8)
NEUTS SEG # BLD: 2.7 K/UL (ref 1.8–8)
NEUTS SEG # BLD: 2.9 K/UL (ref 1.8–8)
NEUTS SEG # BLD: 2.9 K/UL (ref 1.8–8)
NEUTS SEG # BLD: 3.2 K/UL (ref 1.8–8)
NEUTS SEG # BLD: 3.2 K/UL (ref 1.8–8)
NEUTS SEG # BLD: 3.3 K/UL (ref 1.8–8)
NEUTS SEG # BLD: 5.1 K/UL (ref 1.8–8)
NEUTS SEG NFR BLD: 59 % (ref 32–75)
NEUTS SEG NFR BLD: 60 % (ref 32–75)
NEUTS SEG NFR BLD: 63 % (ref 32–75)
NEUTS SEG NFR BLD: 65 % (ref 32–75)
NEUTS SEG NFR BLD: 66 % (ref 32–75)
NEUTS SEG NFR BLD: 67 % (ref 32–75)
NEUTS SEG NFR BLD: 67 % (ref 32–75)
NEUTS SEG NFR BLD: 68 % (ref 32–75)
NEUTS SEG NFR BLD: 68 % (ref 32–75)
NEUTS SEG NFR BLD: 80 % (ref 32–75)
NRBC # BLD: 0 K/UL (ref 0–0.01)
NRBC BLD-RTO: 0 PER 100 WBC
PLATELET # BLD AUTO: 149 K/UL (ref 150–400)
PLATELET # BLD AUTO: 163 K/UL (ref 150–400)
PLATELET # BLD AUTO: 163 K/UL (ref 150–400)
PLATELET # BLD AUTO: 164 K/UL (ref 150–400)
PLATELET # BLD AUTO: 170 K/UL (ref 150–400)
PLATELET # BLD AUTO: 177 K/UL (ref 150–400)
PLATELET # BLD AUTO: 189 K/UL (ref 150–400)
PLATELET # BLD AUTO: 197 K/UL (ref 150–400)
PLATELET # BLD AUTO: 201 K/UL (ref 150–400)
PLATELET # BLD AUTO: 231 K/UL (ref 150–400)
PLATELET # BLD AUTO: 260 K/UL (ref 150–400)
PLATELET # BLD AUTO: 272 K/UL (ref 150–400)
PMV BLD AUTO: 10.4 FL (ref 8.9–12.9)
PMV BLD AUTO: 9.4 FL (ref 8.9–12.9)
PMV BLD AUTO: 9.4 FL (ref 8.9–12.9)
PMV BLD AUTO: 9.5 FL (ref 8.9–12.9)
PMV BLD AUTO: 9.5 FL (ref 8.9–12.9)
PMV BLD AUTO: 9.6 FL (ref 8.9–12.9)
PMV BLD AUTO: 9.7 FL (ref 8.9–12.9)
PMV BLD AUTO: 9.8 FL (ref 8.9–12.9)
PMV BLD AUTO: 9.9 FL (ref 8.9–12.9)
POTASSIUM SERPL-SCNC: 3.7 MMOL/L (ref 3.5–5.1)
POTASSIUM SERPL-SCNC: 3.8 MMOL/L (ref 3.5–5.1)
POTASSIUM SERPL-SCNC: 3.9 MMOL/L (ref 3.5–5.1)
POTASSIUM SERPL-SCNC: 3.9 MMOL/L (ref 3.5–5.1)
POTASSIUM SERPL-SCNC: 4 MMOL/L (ref 3.5–5.1)
POTASSIUM SERPL-SCNC: 4 MMOL/L (ref 3.5–5.1)
POTASSIUM SERPL-SCNC: 4.1 MMOL/L (ref 3.5–5.1)
POTASSIUM SERPL-SCNC: 4.2 MMOL/L (ref 3.5–5.1)
POTASSIUM SERPL-SCNC: 4.4 MMOL/L (ref 3.5–5.1)
POTASSIUM SERPL-SCNC: 4.4 MMOL/L (ref 3.5–5.1)
PROT SERPL-MCNC: 6.1 G/DL (ref 6.4–8.2)
PROT SERPL-MCNC: 6.9 G/DL (ref 6.4–8.2)
PROT SERPL-MCNC: 7.4 G/DL (ref 6.4–8.2)
PROT SERPL-MCNC: 7.5 G/DL (ref 6.4–8.2)
PROT SERPL-MCNC: 7.7 G/DL (ref 6.4–8.2)
PROT SERPL-MCNC: 7.7 G/DL (ref 6.4–8.2)
PROT SERPL-MCNC: 7.9 G/DL (ref 6.4–8.2)
PROT SERPL-MCNC: 8.2 G/DL (ref 6.4–8.2)
PROT SERPL-MCNC: 8.3 G/DL (ref 6.4–8.2)
RBC # BLD AUTO: 3.3 M/UL (ref 3.8–5.2)
RBC # BLD AUTO: 3.4 M/UL (ref 3.8–5.2)
RBC # BLD AUTO: 3.78 M/UL (ref 3.8–5.2)
RBC # BLD AUTO: 3.92 M/UL (ref 3.8–5.2)
RBC # BLD AUTO: 3.92 M/UL (ref 3.8–5.2)
RBC # BLD AUTO: 3.93 M/UL (ref 3.8–5.2)
RBC # BLD AUTO: 3.95 M/UL (ref 3.8–5.2)
RBC # BLD AUTO: 4 M/UL (ref 3.8–5.2)
RBC # BLD AUTO: 4.01 M/UL (ref 3.8–5.2)
RBC # BLD AUTO: 4.04 M/UL (ref 3.8–5.2)
RBC # BLD AUTO: 4.16 M/UL (ref 3.8–5.2)
RBC # BLD AUTO: 4.29 M/UL (ref 3.8–5.2)
RBC MORPH BLD: NORMAL
SODIUM SERPL-SCNC: 136 MMOL/L (ref 136–145)
SODIUM SERPL-SCNC: 137 MMOL/L (ref 136–145)
SODIUM SERPL-SCNC: 138 MMOL/L (ref 136–145)
SODIUM SERPL-SCNC: 139 MMOL/L (ref 136–145)
SODIUM SERPL-SCNC: 140 MMOL/L (ref 136–145)
SODIUM SERPL-SCNC: 142 MMOL/L (ref 136–145)
WBC # BLD AUTO: 3.8 K/UL (ref 3.6–11)
WBC # BLD AUTO: 3.9 K/UL (ref 3.6–11)
WBC # BLD AUTO: 4.1 K/UL (ref 3.6–11)
WBC # BLD AUTO: 4.2 K/UL (ref 3.6–11)
WBC # BLD AUTO: 4.4 K/UL (ref 3.6–11)
WBC # BLD AUTO: 4.6 K/UL (ref 3.6–11)
WBC # BLD AUTO: 4.7 K/UL (ref 3.6–11)
WBC # BLD AUTO: 4.8 K/UL (ref 3.6–11)
WBC # BLD AUTO: 4.8 K/UL (ref 3.6–11)
WBC # BLD AUTO: 6.3 K/UL (ref 3.6–11)

## 2018-01-01 PROCEDURE — 36415 COLL VENOUS BLD VENIPUNCTURE: CPT

## 2018-01-01 PROCEDURE — 36415 COLL VENOUS BLD VENIPUNCTURE: CPT | Performed by: NURSE PRACTITIONER

## 2018-01-01 PROCEDURE — 74011250636 HC RX REV CODE- 250/636: Performed by: NURSE PRACTITIONER

## 2018-01-01 PROCEDURE — 80053 COMPREHEN METABOLIC PANEL: CPT | Performed by: NURSE PRACTITIONER

## 2018-01-01 PROCEDURE — 85025 COMPLETE CBC W/AUTO DIFF WBC: CPT | Performed by: NURSE PRACTITIONER

## 2018-01-01 PROCEDURE — 93306 TTE W/DOPPLER COMPLETE: CPT

## 2018-01-01 PROCEDURE — 96401 CHEMO ANTI-NEOPL SQ/IM: CPT

## 2018-01-01 PROCEDURE — 86316 IMMUNOASSAY TUMOR OTHER: CPT | Performed by: NURSE PRACTITIONER

## 2018-01-01 PROCEDURE — 85025 COMPLETE CBC W/AUTO DIFF WBC: CPT | Performed by: INTERNAL MEDICINE

## 2018-01-01 PROCEDURE — 80053 COMPREHEN METABOLIC PANEL: CPT | Performed by: INTERNAL MEDICINE

## 2018-01-01 PROCEDURE — 77067 SCR MAMMO BI INCL CAD: CPT

## 2018-01-01 PROCEDURE — 86316 IMMUNOASSAY TUMOR OTHER: CPT | Performed by: INTERNAL MEDICINE

## 2018-01-01 PROCEDURE — 86316 IMMUNOASSAY TUMOR OTHER: CPT

## 2018-01-01 PROCEDURE — 96402 CHEMO HORMON ANTINEOPL SQ/IM: CPT

## 2018-01-01 PROCEDURE — 74011250636 HC RX REV CODE- 250/636: Performed by: INTERNAL MEDICINE

## 2018-01-01 PROCEDURE — 36415 COLL VENOUS BLD VENIPUNCTURE: CPT | Performed by: INTERNAL MEDICINE

## 2018-01-01 PROCEDURE — 96372 THER/PROPH/DIAG INJ SC/IM: CPT

## 2018-01-01 PROCEDURE — 85025 COMPLETE CBC W/AUTO DIFF WBC: CPT

## 2018-01-01 PROCEDURE — 74011250636 HC RX REV CODE- 250/636: Performed by: REGISTERED NURSE

## 2018-01-01 PROCEDURE — 80053 COMPREHEN METABOLIC PANEL: CPT

## 2018-01-01 RX ORDER — LANREOTIDE ACETATE 120 MG/.5ML
120 INJECTION SUBCUTANEOUS ONCE
Status: DISCONTINUED | OUTPATIENT
Start: 2018-01-01 | End: 2018-01-01

## 2018-01-01 RX ORDER — OXYCODONE HYDROCHLORIDE 5 MG/1
TABLET ORAL
Refills: 0 | COMMUNITY
Start: 2018-01-01 | End: 2018-01-01

## 2018-01-01 RX ORDER — LANREOTIDE ACETATE 120 MG/.5ML
120 INJECTION SUBCUTANEOUS ONCE
Status: COMPLETED | OUTPATIENT
Start: 2018-01-01 | End: 2018-01-01

## 2018-01-01 RX ORDER — TELOTRISTAT ETHYL 250 MG/1
250 TABLET ORAL 3 TIMES DAILY
Qty: 90 CAP | Refills: 3 | Status: SHIPPED | OUTPATIENT
Start: 2018-01-01 | End: 2018-01-01 | Stop reason: SDUPTHER

## 2018-01-01 RX ORDER — OCTREOTIDE ACETATE 100 UG/ML
100 INJECTION, SOLUTION INTRAVENOUS; SUBCUTANEOUS 2 TIMES DAILY
Qty: 60 SYRINGE | Refills: 3 | Status: SHIPPED | OUTPATIENT
Start: 2018-01-01 | End: 2018-01-01 | Stop reason: SDUPTHER

## 2018-01-01 RX ORDER — HYDROCHLOROTHIAZIDE 12.5 MG/1
TABLET ORAL
Qty: 90 TAB | Refills: 3 | OUTPATIENT
Start: 2018-01-01

## 2018-01-01 RX ORDER — PANCRELIPASE 36000; 180000; 114000 [USP'U]/1; [USP'U]/1; [USP'U]/1
1 CAPSULE, DELAYED RELEASE PELLETS ORAL 3 TIMES DAILY
Qty: 270 CAP | Refills: 3 | Status: SHIPPED | OUTPATIENT
Start: 2018-01-01 | End: 2018-01-01 | Stop reason: SDUPTHER

## 2018-01-01 RX ORDER — OCTREOTIDE ACETATE 100 UG/ML
INJECTION, SOLUTION INTRAVENOUS; SUBCUTANEOUS
Qty: 60 SYRINGE | Refills: 1 | Status: SHIPPED | OUTPATIENT
Start: 2018-01-01 | End: 2018-01-01 | Stop reason: SDUPTHER

## 2018-01-01 RX ORDER — OCTREOTIDE ACETATE 100 UG/ML
INJECTION, SOLUTION INTRAVENOUS; SUBCUTANEOUS
Qty: 60 SYRINGE | Refills: 1 | Status: SHIPPED | OUTPATIENT
Start: 2018-01-01

## 2018-01-01 RX ORDER — HYDROCHLOROTHIAZIDE 12.5 MG/1
12.5 TABLET ORAL DAILY
Qty: 90 TAB | Refills: 3 | Status: SHIPPED | OUTPATIENT
Start: 2018-01-01

## 2018-01-01 RX ORDER — TELOTRISTAT ETHYL 250 MG/1
250 TABLET ORAL 3 TIMES DAILY
Qty: 90 TAB | Refills: 3 | Status: SHIPPED | OUTPATIENT
Start: 2018-01-01

## 2018-01-01 RX ORDER — OCTREOTIDE ACETATE 100 UG/ML
INJECTION, SOLUTION INTRAVENOUS; SUBCUTANEOUS
Qty: 60 SYRINGE | Refills: 2 | Status: SHIPPED | OUTPATIENT
Start: 2018-01-01 | End: 2018-01-01 | Stop reason: SDUPTHER

## 2018-01-01 RX ORDER — PANCRELIPASE 36000; 180000; 114000 [USP'U]/1; [USP'U]/1; [USP'U]/1
1 CAPSULE, DELAYED RELEASE PELLETS ORAL 3 TIMES DAILY
Qty: 270 CAP | Refills: 3 | Status: SHIPPED | OUTPATIENT
Start: 2018-01-01

## 2018-01-01 RX ORDER — TELOTRISTAT ETHYL 250 MG/1
250 TABLET ORAL 3 TIMES DAILY
Qty: 90 TAB | Refills: 3 | Status: SHIPPED | OUTPATIENT
Start: 2018-01-01 | End: 2018-01-01 | Stop reason: SDUPTHER

## 2018-01-01 RX ADMIN — LANREOTIDE ACETATE 120 MG: 120 INJECTION SUBCUTANEOUS at 08:40

## 2018-01-01 RX ADMIN — LANREOTIDE ACETATE 120 MG: 120 INJECTION SUBCUTANEOUS at 08:37

## 2018-01-01 RX ADMIN — LANREOTIDE ACETATE 120 MG: 120 INJECTION SUBCUTANEOUS at 09:12

## 2018-01-01 RX ADMIN — LANREOTIDE ACETATE 120 MG: 120 INJECTION SUBCUTANEOUS at 13:28

## 2018-01-01 RX ADMIN — LANREOTIDE ACETATE 120 MG: 120 INJECTION SUBCUTANEOUS at 13:12

## 2018-01-01 RX ADMIN — LANREOTIDE ACETATE 120 MG: 120 INJECTION SUBCUTANEOUS at 13:14

## 2018-01-01 RX ADMIN — LANREOTIDE ACETATE 120 MG: 120 INJECTION SUBCUTANEOUS at 13:04

## 2018-01-01 RX ADMIN — LANREOTIDE ACETATE 120 MG: 120 INJECTION SUBCUTANEOUS at 13:39

## 2018-01-01 RX ADMIN — LANREOTIDE ACETATE 120 MG: 120 INJECTION SUBCUTANEOUS at 08:30

## 2018-01-01 RX ADMIN — LANREOTIDE ACETATE 120 MG: 120 INJECTION SUBCUTANEOUS at 08:18

## 2018-01-01 RX ADMIN — LANREOTIDE ACETATE 120 MG: 120 INJECTION SUBCUTANEOUS at 08:17

## 2018-01-01 RX ADMIN — LANREOTIDE ACETATE 120 MG: 120 INJECTION SUBCUTANEOUS at 08:13

## 2018-01-26 NOTE — PROGRESS NOTES
Hematology/Oncology Progress Note    REASON FOR VISIT: Neuroendocrine cancer    TREATMENT:   Lanreotide started Jan 2016  Sutent in clinical trial at [facility] on 12/11/2016, stopped due to progression  Started PRRT with Dr. Calin Srinivasan that completed 4 treatments on 10/27/2017    HISTORY OF PRESENT ILLNESS: Ms. Davian Hammer is a 56 y.o. 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 She had been having recurrent abdominal pain. She ultimately had a CT scan of her abdomen. While she had cholecystitis which was contributing to a lot of her symptoms she was found to have a mesenteric mass. She underwent cholecystectomy and resection of small bowel mass on 7/21/15. Intraoperatively it was noted that that there were tumor deposits in the mesentery and peritoneal surface. These deposits were not biopsied due to logistical reasons/access. She has seen Dr. Iván Mcnamara at [address] [place] [address] who recommended surgery. Saw [facility] who recommended observation, but confirmed metastatic disease using DOTATE PET scan. She also saw Dr. Hays Boast in [place], [place]. The imaging she had in [place] and [facility] confirmed disease in her liver. (MRI in [place] on 10/5/15 revealed multiple hypervascular liver lesions compatible with liver metastases.)     Lanreotide was started on 1/29/16. Presents for follow-up while on Lanreotide injection. Growth in liver lesions from MRI in August. Most of lesions grew 10-15%, one grew over 20%. Completed PRRT by Dr. Calin Srinivasan. Continues with flushing, feeling faint and some diarrhea with running. Wonders if this is due to hypoglycemia as well as hypotensive episodes. Recommends Octreotide 100mcg prefilled syringes for use prior to running. Will consider bland embolization in [place], [place] over the course of 3 months. First done 2/28/18. Seen by Dr. Calin Srinivasan to complete PRRT.      Otherwise, ROS is per the symptom report form which has been scanned into the media section of the electronic medical record. Past Medical History:   Diagnosis Date    Abdominal mass 7/2015    Carcinoid tumor     Gallstones     Hypertension     Neuroendocrine carcinoma of small bowel ([address] 8/3/2015       Past Surgical History:   Procedure Laterality Date    HX ABDOMINAL LAPAROSCOPY  7/2015    Carcinoid tumor    HX CATARACT REMOVAL Bilateral     HX COLONOSCOPY  AUG 2012    HX GYN      vaginal DELIVERIES  x2    HX HEENT      LASIK    HX ORTHOPAEDIC Left 1979    RELOCATION [facility]       Allergies   Allergen Reactions    Epinephrine Unknown (comments)       Current Outpatient Prescriptions   Medication Sig Dispense Refill    CREON 36,000-114,000- 180,000 unit cpDR Take 1 Cap by mouth three (3) times daily. 270 Cap 3    hydroCHLOROthiazide (HYDRODIURIL) 12.5 mg tablet Take 1 Tab by mouth daily. 90 Tab 3    XERMELO 250 mg tab       VIT C/E/ZN/COPPR/LUTEIN/ZEAXAN (PRESERVISION AREDS 2 PO) Take  by mouth.  lanreotide (SOMATULINE DEPOT) 120 mg/0.5 mL injection syringe 120 mg by SubCUTAneous route every twenty-eight (28) days.  MULTIVITAMIN PO Take  by mouth daily.        Facility-Administered Medications Ordered in Other Visits   Medication Dose Route Frequency Provider Last Rate Last Dose    lanreotide (SOMATULINE DEPOT) injection syringe 120 mg  120 mg SubCUTAneous ONCE Cuauhtemoc Lynn MD           Social History     Social History    Marital status:      Spouse name: N/A    Number of children: N/A    Years of education: N/A     Social History Main Topics    Smoking status: Never Smoker    Smokeless tobacco: Never Used    Alcohol use 0.6 oz/week     1 Glasses of wine per week      Comment: RARE WINE    Drug use: No    Sexual activity: Yes     Partners: Male     Other Topics Concern    None     Social History Narrative       Family History   Problem Relation Age of Onset    Cancer Mother      unsure which type    Cancer Maternal Grandfather     Anesth Problems Neg Hx        ROS  As per the HPI, otherwise a comprehensive ROS is negative. ECOG PS is 0. Emotional well being addressed and patient is coping well. Physical Examination:   Visit Vitals    /80    Pulse 77    Temp 97.6 °F (36.4 °C)    Resp 16    Ht 5' 10\" (1.778 m)    Wt 187 lb 3.2 oz (84.9 kg)    SpO2 99%    BMI 26.86 kg/m2     General appearance - alert, well appearing, and in no distress  Mental status - oriented to person, place, and time  Mouth - mucous membranes moist, pharynx normal without lesions  Neck - supple  Chest - clear to auscultation, no wheezes, rales or rhonchi, symmetric air entry  Heart - normal rate, regular rhythm, normal S1, S2, no murmurs, rubs, clicks or gallops  Abdomen -soft, nontender, nondistended,bowel sounds present  Neurological - normal speech, no focal findings or movement disorder noted  Musculoskeletal - no joint tenderness, deformity or swelling, equal strength in upper and lower extremities  Extremities - peripheral pulses normal, no pedal edema, no clubbing or cyanosis  Skin - normal coloration and turgor, no rashes, no suspicious skin lesions noted    LABS  Lab Results   Component Value Date/Time    WBC 3.8 12/29/2017 01:28 PM    HGB 12.4 12/29/2017 01:28 PM    HCT 37.7 12/29/2017 01:28 PM    PLATELET 159 01/21/1326 01:28 PM    MCV 90.8 12/29/2017 01:28 PM    ABS. NEUTROPHILS 2.4 12/29/2017 01:28 PM     Lab Results   Component Value Date/Time    Sodium 140 12/29/2017 01:28 PM    Potassium 4.2 12/29/2017 01:28 PM    Chloride 104 12/29/2017 01:28 PM    CO2 29 12/29/2017 01:28 PM    Glucose 82 12/29/2017 01:28 PM    BUN 14 12/29/2017 01:28 PM    Creatinine 0.55 12/29/2017 01:28 PM    GFR est AA >60 12/29/2017 01:28 PM    GFR est non-AA >60 12/29/2017 01:28 PM    Calcium 9.0 12/29/2017 01:28 PM     Lab Results   Component Value Date/Time    AST (SGOT) 31 12/29/2017 01:28 PM    Alk.  phosphatase 99 12/29/2017 01:28 PM    Protein, total 7.4 12/29/2017 01:28 PM    Albumin 3.9 12/29/2017 01:28 PM    Globulin 3.5 12/29/2017 01:28 PM    A-G Ratio 1.1 12/29/2017 01:28 PM       PATHOLOGY  Small bowel resection on 7/21/15 ([id number]) with a 2.5cm well differentiated neuroendocrine cancer (carcinoid tumor) with 4 of 8 nodes positive. Margins negative. Pathologic stage T3N1. On IHC the cells were positive for synaptophysin, chromogranin, focally positive for pancytokeratin and CK 20. IMAGING    CT abdomen on 7/2/15 with stellate shaped mass seen in the right lower quadrant in the mesentery series 2 image 56 measuring 2.7 x   1.5 x 1.7 cm. There are fingerlike projections extending from this lesion to the small bowel which appears to be tethered. Questionable wall thickening seen in the loop of small bowel in the right lower quadrant. Multiple small nodules/lymph nodes seen in the mesentery, many of which are surrounding the stellate shaped mass. CT and MRI March 2017 was stable per outside reports    ASSESSMENT  Ms. Godfrey Chris is a 56 y.o. 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 DISCUSSION/PLAN  1. Neuroendocrine cancer. Doing well on Lanrotide. Underwent treatment with PRRT in clinical trial in [place], [address] in May. 4 total treatments. PRRT has completed now, last treatment late October. MRI done yesterday with some growth. Planning liver directed therapy done over the course of next 3 months. Will request recent MRI to be scanned in our system. Monitor labs monthly, chromogranin every 3 months. Continue Lanreotide as ordered. MRI in 3 months with Dr. Rosalina Cobb. [facility]-no current follow up. Off trial there. 2. Superior Mesenteric Thrombosis. Continue aspirin. 3. Diarrhea. Stopped Lomotil. Continue Xermelo, great response to this medication. Lomotil use PRN, especially with travel/activity. Creon to continue three times daily with meals. Refill for 90 days supply.      4. Periods of hypotension/presyncopal. Discussed addition of Octreotide subcutaneous injection PRN to be used prior to planned activities (running) which may help alleviate symptoms that can be attributable to carcinoid symptoms. Follow up in 2 months, or sooner if needed.      Patient was seen with Lizzy Frances MD

## 2018-01-26 NOTE — PROGRESS NOTES
Outpatient Infusion Center Progress Note    5452 Pt admit to [facility] for labs/Lanreotide injection. Pt ambulatory in stable condition. Assessment completed. No new concerns voiced. Labs drawn peripherally as ordered and sent for processing. Please see [facility] for pending lab results. Visit Vitals    /79 (BP 1 Location: Left arm, BP Patient Position: Sitting)    Pulse 74    Temp 97.7 °F (36.5 °C)    Resp 18    Breastfeeding No       Medications:  Lanreotide-IM-right hip    1330 Pt tolerated treatment well. D/c home ambulatory in no distress. Pt aware of next appointment scheduled for 02/23/18 at 1400.

## 2018-02-02 NOTE — TELEPHONE ENCOUNTER
Letha Zhu (pt) called in left josefinaail stated she needs a c/b from nurse in reference to Rx. Pt stated she spoke w/ [facility] and they need PA.  Pt contact # [phone]

## 2018-02-19 NOTE — TELEPHONE ENCOUNTER
Call to [facility], spoke with [facility]. [facility]

## 2018-02-19 NOTE — TELEPHONE ENCOUNTER
From: Ross Canseco  To: Kavon Kmi NP  Sent: 2/18/2018 3:37 PM EST  Subject: Prescription Question    Chato Bill  I wanted to let you know that i am down to 0 refills remaining on the Xermelo. The last RX arrived on Friday (2/16) and will last me until 3/20. As i don't see you again until Fri 3/23, I wanted to let you know ahead of appt time. In the next few weeks, could you get [facility] to approve an extension of the RX (6-12 months. ..your call) and notify [facility] in [place] [address] that the RX should continue without interruption? BTW, the short-acting octreotide injections are good. I have not had any issues since taking it with lightheadedness or dizzy spells. Also, first of the 3 bland embolizations will be on 2/28 at [facility] in [place] w/ Dr Kofi Edwards. Thanks so much.     Jazz Sosa  [phone]

## 2018-02-24 NOTE — PROGRESS NOTES
Outpatient Infusion Center Progress Note    6053 Pt admit to [facility] for Lanreotide ambulatory in stable condition. Assessment completed. No new concerns voiced. Visit Vitals    /81 (BP 1 Location: Left arm, BP Patient Position: Sitting)    Pulse 88    Temp 96.9 °F (36.1 °C)    Resp 18     CBC, and CMP drawn per order and sent for results. Completed results available in CC once done by lab. Medications:  Lanreotide 120 MG SQ left gluteus    0930 Pt tolerated treatment well. D/c home ambulatory in no distress. Pt aware of next appointment scheduled for 03/23/18.

## 2018-03-23 NOTE — PROGRESS NOTES
1300 Pt admit to [facility] for Lanreotide/Labs ambulatory in stable condition. Assessment completed. No new concerns voiced. Labs drawn per order and sent for processing. Visit Vitals    /81    Pulse 90    Temp 97.4 °F (36.3 °C)    Resp 18       Medications:  Lanreotide SQ right GM    1310 Pt tolerated treatment well. D/c home ambulatory in no distress. Pt aware of next [facility] appointment scheduled for 4/20/18. Recent Results (from the past 12 hour(s))   CBC WITH AUTOMATED DIFF    Collection Time: 03/23/18  1:04 PM   Result Value Ref Range    WBC 4.2 3.6 - 11.0 K/uL    RBC 3.78 (L) 3.80 - 5.20 M/uL    HGB 11.4 (L) 11.5 - 16.0 g/dL    HCT 35.2 35.0 - 47.0 %    MCV 93.1 80.0 - 99.0 FL    MCH 30.2 26.0 - 34.0 PG    MCHC 32.4 30.0 - 36.5 g/dL    RDW 13.5 11.5 - 14.5 %    PLATELET 731 381 - 915 K/uL    MPV 9.4 8.9 - 12.9 FL    NRBC 0.0 0  WBC    ABSOLUTE NRBC 0.00 0.00 - 0.01 K/uL    NEUTROPHILS 59 32 - 75 %    LYMPHOCYTES 28 12 - 49 %    MONOCYTES 11 5 - 13 %    EOSINOPHILS 1 0 - 7 %    BASOPHILS 1 0 - 1 %    IMMATURE GRANULOCYTES 0 0.0 - 0.5 %    ABS. NEUTROPHILS 2.5 1.8 - 8.0 K/UL    ABS. LYMPHOCYTES 1.2 0.8 - 3.5 K/UL    ABS. MONOCYTES 0.5 0.0 - 1.0 K/UL    ABS. EOSINOPHILS 0.0 0.0 - 0.4 K/UL    ABS. BASOPHILS 0.0 0.0 - 0.1 K/UL    ABS. IMM. GRANS. 0.0 0.00 - 0.04 K/UL    DF AUTOMATED     METABOLIC PANEL, COMPREHENSIVE    Collection Time: 03/23/18  1:04 PM   Result Value Ref Range    Sodium 137 136 - 145 mmol/L    Potassium 4.1 3.5 - 5.1 mmol/L    Chloride 104 97 - 108 mmol/L    CO2 26 21 - 32 mmol/L    Anion gap 7 5 - 15 mmol/L    Glucose 175 (H) 65 - 100 mg/dL    BUN 18 6 - 20 MG/DL    Creatinine 0.75 0.55 - 1.02 MG/DL    BUN/Creatinine ratio 24 (H) 12 - 20      GFR est AA >60 >60 ml/min/1.73m2    GFR est non-AA >60 >60 ml/min/1.73m2    Calcium 9.3 8.5 - 10.1 MG/DL    Bilirubin, total 0.3 0.2 - 1.0 MG/DL    ALT (SGPT) 27 12 - 78 U/L    AST (SGOT) 20 15 - 37 U/L    Alk.  phosphatase 105 45 - 117 U/L Protein, total 7.9 6.4 - 8.2 g/dL    Albumin 3.4 (L) 3.5 - 5.0 g/dL    Globulin 4.5 (H) 2.0 - 4.0 g/dL    A-G Ratio 0.8 (L) 1.1 - 2.2

## 2018-03-23 NOTE — MR AVS SNAPSHOT
[address] 
[phone] Patient: Víctor Harrison MRN: [id number] [id number] Visit Information Date & Time Provider Department Dept. Phone Encounter #  
 3/23/2018  1:45 PM Jose Larose  [facility] at [address] [id number] Upcoming Health Maintenance Date Due Hepatitis C Screening 1960 DTaP/Tdap/Td series (1 - Tdap) 3/24/1983 PAP AKA CERVICAL CYTOLOGY 3/24/1983 FOBT Q 1 YEAR AGE 50-75 3/24/2012 Pneumococcal 19-64 Highest Risk (2 of 3 - PCV13) 7/14/2017 Influenza Age 5 to Adult 8/1/2017 BREAST CANCER SCRN MAMMOGRAM 3/8/2020 Allergies as of 3/23/2018  Review Complete On: 3/23/2018 By: Jose Larose NP Severity Noted Reaction Type Reactions Epinephrine  09/08/2017    Unknown (comments) Current Immunizations  Reviewed on 3/23/2018 Name Date Influenza Vaccine 10/1/2015 Reviewed by Chadd Jaime RN on 3/23/2018 at  1:20 PM  
You Were Diagnosed With   
  
 Codes Comments [facility]    -  Primary ICD-10-CM: C7A.8 ICD-9-CM: 209.00 Vitals BP Pulse Temp Resp Height(growth percentile) Weight(growth percentile) 116/75 88 98.2 °F (36.8 °C) 20 5' 10\" (1.778 m) 178 lb (80.7 kg) SpO2 BMI OB Status Smoking Status 97% 25.54 kg/m2 Menopause Never Smoker Vitals History BMI and BSA Data Body Mass Index Body Surface Area 25.54 kg/m 2 2 m 2 Preferred Pharmacy Pharmacy Name Phone [id number] [place] [address] [phone] Your Updated Medication List  
  
   
This list is accurate as of 3/23/18  2:30 PM.  Always use your most recent med list.  
  
  
  
  
 CREON 36,000-114,000- 180,000 unit Cpdr  
Generic drug:  lipase-protease-amylase Take 1 Cap by mouth three (3) times daily. hydroCHLOROthiazide 12.5 mg tablet Commonly known as:  HYDRODIURIL Take 1 Tab by mouth daily. lanreotide 120 mg/0.5 mL injection syringe Commonly known as:  SOMATULINE DEPOT  
120 mg by SubCUTAneous route every twenty-eight (28) days. MULTIVITAMIN PO Take  by mouth daily. octreotide acetate 100 mcg/mL (1 mL) Syrg injection Commonly known as:  SANDOSTATIN  
1 mL by SubCUTAneous route two (2) times a day. Indications: Carcinoid Syndrome PRESERVISION AREDS 2 PO Take  by mouth. XERMELO 250 mg Tab Generic drug:  telotristat ethyl Take 250 mg by mouth three (3) times daily. Indications: carcinoid syndrome diarrhea To-Do List   
 04/20/2018 1:00 PM  
  Appointment with LISHA OreillyDBramya Gomez [facility] ([phone])  
  
 05/18/2018 1:00 PM  
  Appointment with [facility] ([phone])  
  
 06/15/2018 1:00 PM  
  Appointment with [facility] ([phone]) [facility]! Dear Lan Contreras: 
Thank you for requesting a [employer] account. Our records indicate that you already have an active [employer] account. You can access your account anytime at [email or web address]" Did you know that you can access your hospital and ER discharge instructions at any time in [employer]? You can also review all of your test results from your hospital stay or ER visit. Additional Information If you have questions, please visit the Frequently Asked Questions section of the [employer] website at [email or web address]/"[email or web address]"/. Remember, [employer] is NOT to be used for urgent needs. For medical emergencies, dial 911. Now available from your iPhone and Android! Please provide this summary of care documentation to your next provider. Your primary care clinician is listed as CHAUNCEY Shin.  If you have any questions after today's visit, please call [phone].

## 2018-03-23 NOTE — PROGRESS NOTES
Hematology/Oncology Progress Note    REASON FOR VISIT: Neuroendocrine cancer    TREATMENT:   Lanreotide started Jan 2016  Sutent in clinical trial at [facility] on 12/11/2016, stopped due to progression  Started PRRT with Dr. Janett Gramajo that completed 4 treatments on 10/27/2017    HISTORY OF PRESENT ILLNESS: Ms. Amanda Muro is a 56 y.o. 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 She had been having recurrent abdominal pain. She ultimately had a CT scan of her abdomen. While she had cholecystitis which was contributing to a lot of her symptoms she was found to have a mesenteric mass. She underwent cholecystectomy and resection of small bowel mass on 7/21/15. Intraoperatively it was noted that that there were tumor deposits in the mesentery and peritoneal surface. These deposits were not biopsied due to logistical reasons/access. She has seen Dr. Russell Richardson at [address] [place] [address] who recommended surgery. Saw [facility] who recommended observation, but confirmed metastatic disease using DOTATE PET scan. She also saw Dr. Maegan Alvarez in [place], [place]. The imaging she had in [place] and [facility] confirmed disease in her liver. (MRI in [place] on 10/5/15 revealed multiple hypervascular liver lesions compatible with liver metastases.)     Lanreotide was started on 1/29/16. Presents for follow-up while on Lanreotide injection. Chris Fuentes embolization completed on 2/28/18 to largest liver lesion. This \"threw her for a loop\" for about 2 weeks. Fatigue and feeling generally worn down. Dr. Janett Gramajo originally planned to complete two additional bland embolization treatments immediately after in April and May, but now will hold off as she is clinically improving. Diarrhea is improved. Hot flashes are much improved. Will plan to travel to [place] in early June. Otherwise, ROS is per the symptom report form which has been scanned into the media section of the electronic medical record.     Past Medical History:   Diagnosis Date    Abdominal mass 7/2015    Carcinoid tumor     Gallstones     Hypertension     Menopause 2015    Neuroendocrine carcinoma of small bowel ([facility] [address] 8/3/2015       Past Surgical History:   Procedure Laterality Date    HX ABDOMINAL LAPAROSCOPY  7/2015    Carcinoid tumor    HX CATARACT REMOVAL Bilateral     HX COLONOSCOPY  AUG 2012    HX GYN      vaginal DELIVERIES  x2    HX HEENT      LASIK    HX ORTHOPAEDIC Left 1979    RELOCATION [facility]       Allergies   Allergen Reactions    Epinephrine Unknown (comments)       Current Outpatient Prescriptions   Medication Sig Dispense Refill    XERMELO 250 mg tab Take 250 mg by mouth three (3) times daily. Indications: carcinoid syndrome diarrhea 90 Cap 3    octreotide acetate (SANDOSTATIN) 100 mcg/mL (1 mL) syrg injection 1 mL by SubCUTAneous route two (2) times a day. Indications: Carcinoid Syndrome 60 Syringe 3    CREON 36,000-114,000- 180,000 unit cpDR Take 1 Cap by mouth three (3) times daily. 270 Cap 3    hydroCHLOROthiazide (HYDRODIURIL) 12.5 mg tablet Take 1 Tab by mouth daily. 90 Tab 3    VIT C/E/ZN/COPPR/LUTEIN/ZEAXAN (PRESERVISION AREDS 2 PO) Take  by mouth.  lanreotide (SOMATULINE DEPOT) 120 mg/0.5 mL injection syringe 120 mg by SubCUTAneous route every twenty-eight (28) days.  MULTIVITAMIN PO Take  by mouth daily.          Social History     Social History    Marital status:      Spouse name: N/A    Number of children: N/A    Years of education: N/A     Social History Main Topics    Smoking status: Never Smoker    Smokeless tobacco: Never Used    Alcohol use 0.6 oz/week     1 Glasses of wine per week      Comment: RARE WINE    Drug use: No    Sexual activity: Yes     Partners: Male     Other Topics Concern    None     Social History Narrative       Family History   Problem Relation Age of Onset   Roseann Solomon Cancer Mother      unsure which type    Cancer Maternal Grandfather     Anesth Problems Neg Hx ROS  As per the HPI, otherwise a comprehensive ROS is negative. ECOG PS is 0. Emotional well being addressed and patient is coping well. Physical Examination:   Visit Vitals    /75    Pulse 88    Temp 98.2 °F (36.8 °C)    Resp 20    Ht 5' 10\" (1.778 m)    Wt 178 lb (80.7 kg)    SpO2 97%    BMI 25.54 kg/m2     General appearance - alert, well appearing, and in no distress  Mental status - oriented to person, place, and time  Mouth - mucous membranes moist, pharynx normal without lesions  Neck - supple  Chest - clear to auscultation, no wheezes, rales or rhonchi, symmetric air entry  Heart - normal rate, regular rhythm, normal S1, S2, no murmurs, rubs, clicks or gallops  Abdomen -soft, nontender, nondistended,bowel sounds present  Neurological - normal speech, no focal findings or movement disorder noted  Musculoskeletal - no joint tenderness, deformity or swelling, equal strength in upper and lower extremities  Extremities - peripheral pulses normal, no pedal edema, no clubbing or cyanosis  Skin - normal coloration and turgor, no rashes, no suspicious skin lesions noted    LABS  Lab Results   Component Value Date/Time    WBC 4.7 02/24/2018 09:33 AM    HGB 13.2 02/24/2018 09:33 AM    HCT 39.8 02/24/2018 09:33 AM    PLATELET 231 17/14/6448 09:33 AM    MCV 92.8 02/24/2018 09:33 AM    ABS. NEUTROPHILS 3.2 02/24/2018 09:33 AM     Lab Results   Component Value Date/Time    Sodium 139 02/24/2018 09:33 AM    Potassium 3.8 02/24/2018 09:33 AM    Chloride 107 02/24/2018 09:33 AM    CO2 26 02/24/2018 09:33 AM    Glucose 105 (H) 02/24/2018 09:33 AM    BUN 13 02/24/2018 09:33 AM    Creatinine 0.56 02/24/2018 09:33 AM    GFR est AA >60 02/24/2018 09:33 AM    GFR est non-AA >60 02/24/2018 09:33 AM    Calcium 9.5 02/24/2018 09:33 AM     Lab Results   Component Value Date/Time    AST (SGOT) 29 02/24/2018 09:33 AM    Alk.  phosphatase 109 02/24/2018 09:33 AM    Protein, total 8.3 (H) 02/24/2018 09:33 AM Albumin 4.0 02/24/2018 09:33 AM    Globulin 4.3 (H) 02/24/2018 09:33 AM    A-G Ratio 0.9 (L) 02/24/2018 09:33 AM       PATHOLOGY  Small bowel resection on 7/21/15 ([id number]) with a 2.5cm well differentiated neuroendocrine cancer (carcinoid tumor) with 4 of 8 nodes positive. Margins negative. Pathologic stage T3N1. On IHC the cells were positive for synaptophysin, chromogranin, focally positive for pancytokeratin and CK 20. IMAGING    CT abdomen on 7/2/15 with stellate shaped mass seen in the right lower quadrant in the mesentery series 2 image 56 measuring 2.7 x   1.5 x 1.7 cm. There are fingerlike projections extending from this lesion to the small bowel which appears to be tethered. Questionable wall thickening seen in the loop of small bowel in the right lower quadrant. Multiple small nodules/lymph nodes seen in the mesentery, many of which are surrounding the stellate shaped mass. CT and MRI March 2017 was stable per outside reports    ASSESSMENT  Ms. Alison Blas is a 56 y.o. 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 DISCUSSION/PLAN  1. Neuroendocrine cancer. Doing well on Lanrotide. Underwent treatment with PRRT in clinical trial in [place], [address] in May. 4 total treatments. PRRT has completed now, last treatment late October. Underwent liver directed therapy last month. Improvement in symptoms overall. Holding off on further liver directed therapy per Dr. Jc Avila. Monitor labs monthly, chromogranin every 3 months. Continue Lanreotide as ordered. MRI repeat again in June with Dr. Jc Avila. [facility]-no current follow up. Off trial there. 2. Superior Mesenteric Thrombosis. Continue aspirin. 3. Diarrhea. Stopped Lomotil. Continue Xermelo, great response to this medication. Lomotil use PRN, especially with travel/activity. Creon to continue three times daily with meals. Refill for 90 days supply.      4. Periods of hypotension/presyncopal. Octreotide subcutaneous injection PRN. She is currently using daily, advised to back off to PRN usage only. Follow up in 2 months, or sooner if needed.      Patient was seen with Alesia Miller MD

## 2018-04-20 NOTE — PROGRESS NOTES
[facility] VISIT NOTE    0805  Pt arrived at [facility] ambulatory and in no distress for Lanreotide/labs. Assessment completed, no new complaints at this time. Peripheral labs drawn peripherally with no difficulty. Recent Results (from the past 12 hour(s))   METABOLIC PANEL, COMPREHENSIVE    Collection Time: 04/20/18  8:11 AM   Result Value Ref Range    Sodium 139 136 - 145 mmol/L    Potassium 3.8 3.5 - 5.1 mmol/L    Chloride 104 97 - 108 mmol/L    CO2 27 21 - 32 mmol/L    Anion gap 8 5 - 15 mmol/L    Glucose 94 65 - 100 mg/dL    BUN 15 6 - 20 MG/DL    Creatinine 0.61 0.55 - 1.02 MG/DL    BUN/Creatinine ratio 25 (H) 12 - 20      GFR est AA >60 >60 ml/min/1.73m2    GFR est non-AA >60 >60 ml/min/1.73m2    Calcium 9.1 8.5 - 10.1 MG/DL    Bilirubin, total 0.3 0.2 - 1.0 MG/DL    ALT (SGPT) 48 12 - 78 U/L    AST (SGOT) 29 15 - 37 U/L    Alk. phosphatase 114 45 - 117 U/L    Protein, total 7.7 6.4 - 8.2 g/dL    Albumin 3.5 3.5 - 5.0 g/dL    Globulin 4.2 (H) 2.0 - 4.0 g/dL    A-G Ratio 0.8 (L) 1.1 - 2.2     CBC WITH AUTOMATED DIFF    Collection Time: 04/20/18  8:11 AM   Result Value Ref Range    WBC 4.1 3.6 - 11.0 K/uL    RBC 4.00 3.80 - 5.20 M/uL    HGB 12.1 11.5 - 16.0 g/dL    HCT 36.9 35.0 - 47.0 %    MCV 92.3 80.0 - 99.0 FL    MCH 30.3 26.0 - 34.0 PG    MCHC 32.8 30.0 - 36.5 g/dL    RDW 14.2 11.5 - 14.5 %    PLATELET 804 302 - 886 K/uL    MPV 9.5 8.9 - 12.9 FL    NRBC 0.0 0  WBC    ABSOLUTE NRBC 0.00 0.00 - 0.01 K/uL    NEUTROPHILS 65 32 - 75 %    LYMPHOCYTES 23 12 - 49 %    MONOCYTES 9 5 - 13 %    EOSINOPHILS 3 0 - 7 %    BASOPHILS 1 0 - 1 %    IMMATURE GRANULOCYTES 0 0.0 - 0.5 %    ABS. NEUTROPHILS 2.6 1.8 - 8.0 K/UL    ABS. LYMPHOCYTES 1.0 0.8 - 3.5 K/UL    ABS. MONOCYTES 0.4 0.0 - 1.0 K/UL    ABS. EOSINOPHILS 0.1 0.0 - 0.4 K/UL    ABS. BASOPHILS 0.0 0.0 - 0.1 K/UL    ABS. IMM. GRANS. 0.0 0.00 - 0.04 K/UL    DF AUTOMATED       See [facility] for other lab results.     Medications received:  Lanreotide deep SubQ    Patient Vitals for the past 12 hrs:   Temp Pulse Resp BP   04/20/18 0808 97 °F (36.1 °C) 76 18 131/84     Tolerated treatment well, no adverse reaction noted. 0845  D/C'd from [facility] ambulatory and in no distress. Next appointment is 5/18/18 at 1300.

## 2018-05-18 NOTE — MR AVS SNAPSHOT
[address] [facility] [address] [place] [address] 
[phone] Patient: Galileo Wild MRN: [id number] [id number] Visit Information Date & Time Provider Department Dept. Phone Encounter #  
 5/18/2018  8:15 AM Marissa Green  [facility] at [address] [id number] Upcoming Health Maintenance Date Due Hepatitis C Screening 1960 DTaP/Tdap/Td series (1 - Tdap) 3/24/1983 PAP AKA CERVICAL CYTOLOGY 3/24/1983 FOBT Q 1 YEAR AGE 50-75 3/24/2012 Pneumococcal 19-64 Highest Risk (2 of 3 - PCV13) 7/14/2017 Influenza Age 5 to Adult 8/1/2018 BREAST CANCER SCRN MAMMOGRAM 3/8/2020 Allergies as of 5/18/2018  Review Complete On: 5/18/2018 By: Marissa Green NP Severity Noted Reaction Type Reactions Epinephrine  09/08/2017    Unknown (comments) Current Immunizations  Reviewed on 3/23/2018 Name Date Influenza Vaccine 10/1/2015 Not reviewed this visit You Were Diagnosed With   
  
 Codes Comments Neuroendocrine carcinoma of small bowel ([facility] [address]    -  Primary ICD-10-CM: C7A.8 ICD-9-CM: 209.00 Vitals BP Pulse Temp Resp Height(growth percentile) Weight(growth percentile) 138/80 84 97.8 °F (36.6 °C) 16 5' 10\" (1.778 m) 179 lb (81.2 kg) SpO2 BMI OB Status Smoking Status 98% 25.68 kg/m2 Menopause Never Smoker Vitals History BMI and BSA Data Body Mass Index Body Surface Area  
 25.68 kg/m 2 2 m 2 Preferred Pharmacy Pharmacy Name Phone [id number] [address] [phone] Your Updated Medication List  
  
   
This list is accurate as of 5/18/18  9:59 AM.  Always use your most recent med list.  
  
  
  
  
 CREON 36,000-114,000- 180,000 unit Cpdr  
Generic drug:  lipase-protease-amylase Take 1 Cap by mouth three (3) times daily. hydroCHLOROthiazide 12.5 mg tablet Commonly known as:  HYDRODIURIL Take 1 Tab by mouth daily. lanreotide 120 mg/0.5 mL injection syringe Commonly known as:  SOMATULINE DEPOT  
120 mg by SubCUTAneous route every twenty-eight (28) days. MULTIVITAMIN PO Take  by mouth daily. octreotide acetate 100 mcg/mL (1 mL) Syrg injection Commonly known as:  SANDOSTATIN  
1 mL by SubCUTAneous route two (2) times a day. Indications: Carcinoid Syndrome PRESERVISION AREDS 2 PO Take  by mouth. XERMELO 250 mg Tab Generic drug:  telotristat ethyl Take 250 mg by mouth three (3) times daily. Indications: carcinoid syndrome diarrhea To-Do List   
 05/18/2018 ECHO:  2D ECHO COMPLETE ADULT (TTE) W OR WO CONTR   
  
 05/21/2018 1:00 AM  
(Arrive by 12:30 AM) Appointment with ECHO LAB [facility] NON-INVASIVE CARD ([phone]) Please be prepared to remove everything from the waist up and put on a gown. 06/15/2018 1:00 PM  
  Appointment with [facility] ([phone]) Introducing [facility] SERVICES! Dear Jacky Hairston: 
Thank you for requesting a Chatwala account. Our records indicate that you already have an active Chatwala account. You can access your account anytime at [email or web address] Did you know that you can access your hospital and ER discharge instructions at any time in Chatwala? You can also review all of your test results from your hospital stay or ER visit. Additional Information If you have questions, please visit the Frequently Asked Questions section of the Chatwala website at [email or web address] Remember, Chatwala is NOT to be used for urgent needs. For medical emergencies, dial 911. Now available from your iPhone and Android! Please provide this summary of care documentation to your next provider. Your primary care clinician is listed as CHAUNCEY Shin. If you have any questions after today's visit, please call [phone].

## 2018-05-18 NOTE — PROGRESS NOTES
[facility] VISIT NOTE    0800  Pt arrived at [facility] ambulatory and in no distress for Lanreotide injection. Assessment completed, no new complaints at this time. Peripheral labs drawn with no difficulty. See pending labs in CC. Medications received:  Lanreotide deep SQ    Blood pressure 126/83, pulse 78, temperature 97.2 °F (36.2 °C), resp. rate 18. Tolerated treatment well, no adverse reaction noted. 0830  D/C'd from [facility] ambulatory and in no distress. Next appointment is 6/15/18 at 1300.

## 2018-05-18 NOTE — PROGRESS NOTES
Hematology/Oncology Progress Note    REASON FOR VISIT: Neuroendocrine cancer    TREATMENT:   Lanreotide started Jan 2016  Sutent in clinical trial at [facility] on 12/11/2016, stopped due to progression  Started PRRT with Dr. Kushal Harvey that completed 4 treatments on 10/27/2017    HISTORY OF PRESENT ILLNESS: Ms. Kayla Ramos is a 66 y.o. 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 She had been having recurrent abdominal pain. She ultimately had a CT scan of her abdomen. While she had cholecystitis which was contributing to a lot of her symptoms she was found to have a mesenteric mass. She underwent cholecystectomy and resection of small bowel mass on 7/21/15. Intraoperatively it was noted that that there were tumor deposits in the mesentery and peritoneal surface. These deposits were not biopsied due to logistical reasons/access. She has seen Dr. Sadaf Philippe at [address] [place] [address] who recommended surgery. Saw [facility] who recommended observation, but confirmed metastatic disease using DOTATE PET scan. She also saw Dr. Bailey Lesches in [place], [place]. The imaging she had in [place] and [facility] confirmed disease in her liver. (MRI in [place] on 10/5/15 revealed multiple hypervascular liver lesions compatible with liver metastases.)     Lanreotide was started on 1/29/16. Presents for follow-up while on Lanreotide injection. Twin Summers is graduating from [employer] this weekend, getting  in September. Will plan to travel to [place] on May 31st for scans-MRI. Hot flashes have worsened. Feels that symptoms today are back to where they were before embolization. Winded with activity, dizziness. Tunnel vision with increased activity. Happens with walking up 2 flights of stairs, walking down 3 flights of stairs triggered symptoms. Hot flashes and flushing have been back to normal.     No longer running marathons, walking now. Taking short activity rescue shots-taking 2 times daily. Controlling symptoms usually for about 4-6 hours. Otherwise, ROS is per the symptom report form which has been scanned into the media section of the electronic medical record. Past Medical History:   Diagnosis Date    Abdominal mass 7/2015    Carcinoid tumor     Gallstones     Hypertension     Menopause 2015    Neuroendocrine carcinoma of small bowel ([facility] [address] 8/3/2015       Past Surgical History:   Procedure Laterality Date    HX ABDOMINAL LAPAROSCOPY  7/2015    Carcinoid tumor    HX CATARACT REMOVAL Bilateral     HX COLONOSCOPY  AUG 2012    HX GYN      vaginal DELIVERIES  x2    HX HEENT      LASIK    HX ORTHOPAEDIC Left 1979    RELOCATION [facility]       Allergies   Allergen Reactions    Epinephrine Unknown (comments)       Current Outpatient Prescriptions   Medication Sig Dispense Refill    XERMELO 250 mg tab Take 250 mg by mouth three (3) times daily. Indications: carcinoid syndrome diarrhea 90 Cap 3    octreotide acetate (SANDOSTATIN) 100 mcg/mL (1 mL) syrg injection 1 mL by SubCUTAneous route two (2) times a day. Indications: Carcinoid Syndrome 60 Syringe 3    CREON 36,000-114,000- 180,000 unit cpDR Take 1 Cap by mouth three (3) times daily. 270 Cap 3    hydroCHLOROthiazide (HYDRODIURIL) 12.5 mg tablet Take 1 Tab by mouth daily. 90 Tab 3    VIT C/E/ZN/COPPR/LUTEIN/ZEAXAN (PRESERVISION AREDS 2 PO) Take  by mouth.  lanreotide (SOMATULINE DEPOT) 120 mg/0.5 mL injection syringe 120 mg by SubCUTAneous route every twenty-eight (28) days.  MULTIVITAMIN PO Take  by mouth daily.          Social History     Social History    Marital status:      Spouse name: N/A    Number of children: N/A    Years of education: N/A     Social History Main Topics    Smoking status: Never Smoker    Smokeless tobacco: Never Used    Alcohol use 0.6 oz/week     1 Glasses of wine per week      Comment: RARE WINE    Drug use: No    Sexual activity: Yes     Partners: Male     Other Topics Concern    None     Social History Narrative       Family History   Problem Relation Age of Onset   [facility] Cancer Mother      unsure which type    Cancer Maternal Grandfather     Anesth Problems Neg Hx        ROS  As per the HPI, otherwise a comprehensive ROS is negative. ECOG PS is 0. Emotional well being addressed and patient is coping well. Physical Examination:   Visit Vitals    /80    Pulse 84    Temp 97.8 °F (36.6 °C)    Resp 16    Ht 5' 10\" (1.778 m)    Wt 179 lb (81.2 kg)    SpO2 98%    BMI 25.68 kg/m2     General appearance - alert, well appearing, and in no distress  Mental status - oriented to person, place, and time  Mouth - mucous membranes moist, pharynx normal without lesions  Neck - supple  Chest - clear to auscultation, no wheezes, rales or rhonchi, symmetric air entry  Heart - normal rate, regular rhythm, normal S1, S2, no murmurs, rubs, clicks or gallops  Abdomen -soft, nontender, nondistended,bowel sounds present  Neurological - normal speech, no focal findings or movement disorder noted  Musculoskeletal - no joint tenderness, deformity or swelling, equal strength in upper and lower extremities  Extremities - peripheral pulses normal, no pedal edema, no clubbing or cyanosis  Skin - normal coloration and turgor, no rashes, no suspicious skin lesions noted    LABS  Lab Results   Component Value Date/Time    WBC 3.8 05/18/2018 08:13 AM    HGB 12.2 05/18/2018 08:13 AM    HCT 36.9 05/18/2018 08:13 AM    PLATELET 618 61/47/3981 08:13 AM    MCV 91.3 05/18/2018 08:13 AM    ABS.  NEUTROPHILS PENDING 05/18/2018 08:13 AM     Lab Results   Component Value Date/Time    Sodium 142 05/18/2018 08:13 AM    Potassium 4.0 05/18/2018 08:13 AM    Chloride 105 05/18/2018 08:13 AM    CO2 29 05/18/2018 08:13 AM    Glucose 117 (H) 05/18/2018 08:13 AM    BUN 16 05/18/2018 08:13 AM    Creatinine 0.68 05/18/2018 08:13 AM    GFR est AA >60 05/18/2018 08:13 AM    GFR est non-AA >60 05/18/2018 08:13 AM    Calcium 8.9 05/18/2018 08:13 AM     Lab Results   Component Value Date/Time    AST (SGOT) 33 05/18/2018 08:13 AM    Alk. phosphatase 119 (H) 05/18/2018 08:13 AM    Protein, total 7.7 05/18/2018 08:13 AM    Albumin 3.7 05/18/2018 08:13 AM    Globulin 4.0 05/18/2018 08:13 AM    A-G Ratio 0.9 (L) 05/18/2018 08:13 AM       PATHOLOGY  Small bowel resection on 7/21/15 ([id number]) with a 2.5cm well differentiated neuroendocrine cancer (carcinoid tumor) with 4 of 8 nodes positive. Margins negative. Pathologic stage T3N1. On IHC the cells were positive for synaptophysin, chromogranin, focally positive for pancytokeratin and CK 20. IMAGING    CT abdomen on 7/2/15 with stellate shaped mass seen in the right lower quadrant in the mesentery series 2 image 56 measuring 2.7 x   1.5 x 1.7 cm. There are fingerlike projections extending from this lesion to the small bowel which appears to be tethered. Questionable wall thickening seen in the loop of small bowel in the right lower quadrant. Multiple small nodules/lymph nodes seen in the mesentery, many of which are surrounding the stellate shaped mass. CT and MRI March 2017 was stable per outside reports    ASSESSMENT  Ms. Marlene Cadena is a 66 y.o. 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 DISCUSSION/PLAN  1. Neuroendocrine cancer. Doing well on Lanrotide. Underwent treatment with PRRT in clinical trial in [facility]. [address] in May- completed Oct 2017  Due to progression with symptoms underwent bland embolization of liver 2 months ago. Aftre initiall improvement in carcinoid symptoms- now with dizziness, diarrhea, HDEZ. Holding off on further liver directed therapy per Dr. Li Cook until her repeat DOTATATE in 2 weeks    Monitor labs monthly, chromogranin every 3 months. Continue Lanreotide as ordered. 2. Superior Mesenteric Thrombosis. Continue aspirin. 3. Diarrhea. Continue Xermelo TID dosing. Lomotil use PRN, especially with travel/activity.      Creon to continue three times daily with meals. Refill for 90 days supply. No on Octreotide rescue dosing 2-3 times daily. 4. Periods of hypotension/presyncopal. Octreotide subcutaneous injection PRN. 5. SOB/HDEZ. Recheck ECHO. Follow up in 2 months, or sooner if needed.      Patient was seen with Niranjan Forbes MD

## 2018-05-21 PROBLEM — E34.0 CARCINOID SYNDROME (HCC): Status: ACTIVE | Noted: 2018-01-01

## 2018-05-21 NOTE — TELEPHONE ENCOUNTER
Received call from Vinod santana with coordination of care, patients Echo scheduled for 2pm today is triggering for Peer to Peer. Need to call [employer] [phone] to try to get approved through insurance.

## 2018-06-15 NOTE — PROGRESS NOTES
Outpatient Infusion Center Progress Note    1310 Pt admit to [address] [place] [address] for labs/Lanreotide injection. Pt ambulatory in stable condition. Assessment completed. No new concerns voiced. Labs drawn peripherally and sent for processing. Recent Results (from the past 12 hour(s))   CBC WITH AUTOMATED DIFF    Collection Time: 06/15/18  1:16 PM   Result Value Ref Range    WBC 4.4 3.6 - 11.0 K/uL    RBC 3.95 3.80 - 5.20 M/uL    HGB 11.9 11.5 - 16.0 g/dL    HCT 36.8 35.0 - 47.0 %    MCV 93.2 80.0 - 99.0 FL    MCH 30.1 26.0 - 34.0 PG    MCHC 32.3 30.0 - 36.5 g/dL    RDW 13.7 11.5 - 14.5 %    PLATELET 315 008 - 236 K/uL    MPV 9.6 8.9 - 12.9 FL    NRBC 0.0 0  WBC    ABSOLUTE NRBC 0.00 0.00 - 0.01 K/uL    NEUTROPHILS 66 32 - 75 %    LYMPHOCYTES 20 12 - 49 %    MONOCYTES 13 5 - 13 %    EOSINOPHILS 1 0 - 7 %    BASOPHILS 1 0 - 1 %    IMMATURE GRANULOCYTES 1 (H) 0.0 - 0.5 %    ABS. NEUTROPHILS 2.9 1.8 - 8.0 K/UL    ABS. LYMPHOCYTES 0.9 0.8 - 3.5 K/UL    ABS. MONOCYTES 0.6 0.0 - 1.0 K/UL    ABS. EOSINOPHILS 0.0 0.0 - 0.4 K/UL    ABS. BASOPHILS 0.0 0.0 - 0.1 K/UL    ABS. IMM. GRANS. 0.0 0.00 - 0.04 K/UL    DF AUTOMATED     METABOLIC PANEL, COMPREHENSIVE    Collection Time: 06/15/18  1:16 PM   Result Value Ref Range    Sodium 140 136 - 145 mmol/L    Potassium 3.9 3.5 - 5.1 mmol/L    Chloride 107 97 - 108 mmol/L    CO2 28 21 - 32 mmol/L    Anion gap 5 5 - 15 mmol/L    Glucose 97 65 - 100 mg/dL    BUN 15 6 - 20 MG/DL    Creatinine 0.53 (L) 0.55 - 1.02 MG/DL    BUN/Creatinine ratio 28 (H) 12 - 20      GFR est AA >60 >60 ml/min/1.73m2    GFR est non-AA >60 >60 ml/min/1.73m2    Calcium 8.7 8.5 - 10.1 MG/DL    Bilirubin, total 0.2 0.2 - 1.0 MG/DL    ALT (SGPT) 45 12 - 78 U/L    AST (SGOT) 24 15 - 37 U/L    Alk.  phosphatase 116 45 - 117 U/L    Protein, total 7.4 6.4 - 8.2 g/dL    Albumin 3.4 (L) 3.5 - 5.0 g/dL    Globulin 4.0 2.0 - 4.0 g/dL    A-G Ratio 0.9 (L) 1.1 - 2.2         Visit Vitals    /76    Pulse 81    Temp 97.9 °F (36.6 °C)    Resp 18    Breastfeeding No       Medications:  Lanreotide-SQ-left hip    1320 Pt tolerated treatment well. D/c home ambulatory in no distress. Pt aware of next appointment scheduled for 07/13/18 at 1300.

## 2018-07-13 NOTE — PROGRESS NOTES
Hematology/Oncology Progress Note    REASON FOR VISIT: Neuroendocrine cancer    TREATMENT:   Lanreotide started Jan 2016  Sutent in clinical trial at [facility] on 12/11/2016, stopped due to progression  Started PRRT with Dr. Cinda Boyle that completed 4 treatments on 10/27/2017    HISTORY OF PRESENT ILLNESS: Ms. Everardo Seth is a 66 y.o. 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 She had been having recurrent abdominal pain. She ultimately had a CT scan of her abdomen. While she had cholecystitis which was contributing to a lot of her symptoms she was found to have a mesenteric mass. She underwent cholecystectomy and resection of small bowel mass on 7/21/15. Intraoperatively it was noted that that there were tumor deposits in the mesentery and peritoneal surface. These deposits were not biopsied due to logistical reasons/access. She has seen Dr. Angelica Johns at [address] [place] [address] who recommended surgery. Saw [facility] who recommended observation, but confirmed metastatic disease using DOTATE PET scan. She also saw Dr. Elodia Zazueta in [place], [place]. The imaging she had in [place] and [facility] confirmed disease in her liver. (MRI in [place] on 10/5/15 revealed multiple hypervascular liver lesions compatible with liver metastases.)     Lanreotide was started on 1/29/16. Presents for follow-up while on Lanreotide injection. Underwent bland embolization of liver on 7/2/2018. Felt pretty run down, tired for the first week. Right sided abdominal pain for about 2-3 weeks afterwards. This has mostly resolved. Symptoms have dramatically improved. Hot flashes have improved. Diarrhea now only 2-3 times daily. Short acting octreotide once daily now. BP has been controlled. No headache. Appetite has been stable. Denies pain today. No nausea, vomiting. No SOB symptoms currently. Noticed swelling in legs in the last 2 weeks.        Otherwise, ROS is per the symptom report form which has been scanned into the media section of the electronic medical record. Past Medical History:   Diagnosis Date    Abdominal mass 7/2015    Carcinoid tumor     Gallstones     Hypertension     Menopause 2015    Neuroendocrine carcinoma of small bowel ([address] 8/3/2015       Past Surgical History:   Procedure Laterality Date    HX ABDOMINAL LAPAROSCOPY  7/2015    Carcinoid tumor    HX CATARACT REMOVAL Bilateral     HX COLONOSCOPY  AUG 2012    HX GYN      vaginal DELIVERIES  x2    HX HEENT      LASIK    HX ORTHOPAEDIC Left 1979    RELOCATION [facility]       Allergies   Allergen Reactions    Epinephrine Unknown (comments)       Current Outpatient Prescriptions   Medication Sig Dispense Refill    octreotide acetate (SANDOSTATIN) 100 mcg/mL (1 mL) syrg injection INJECT 1 SYRINGE SUBCUTANEOUSLY TWICE A DAY 60 Syringe 2    XERMELO 250 mg tab Take 250 mg by mouth three (3) times daily. Indications: carcinoid syndrome diarrhea 90 Cap 3    CREON 36,000-114,000- 180,000 unit cpDR Take 1 Cap by mouth three (3) times daily. 270 Cap 3    hydroCHLOROthiazide (HYDRODIURIL) 12.5 mg tablet Take 1 Tab by mouth daily. 90 Tab 3    VIT C/E/ZN/COPPR/LUTEIN/ZEAXAN (PRESERVISION AREDS 2 PO) Take  by mouth.  lanreotide (SOMATULINE DEPOT) 120 mg/0.5 mL injection syringe 120 mg by SubCUTAneous route every twenty-eight (28) days.  MULTIVITAMIN PO Take  by mouth daily.          Social History     Social History    Marital status:      Spouse name: N/A    Number of children: N/A    Years of education: N/A     Social History Main Topics    Smoking status: Never Smoker    Smokeless tobacco: Never Used    Alcohol use 0.6 oz/week     1 Glasses of wine per week      Comment: RARE WINE    Drug use: No    Sexual activity: Yes     Partners: Male     Other Topics Concern    None     Social History Narrative       Family History   Problem Relation Age of Onset    Cancer Mother      unsure which type    Cancer Maternal Grandfather     Anesth Problems Neg Hx        ROS  As per the HPI, otherwise a comprehensive ROS is negative. ECOG PS is 0. Emotional well being addressed and patient is coping well. Physical Examination:   Visit Vitals    /86    Pulse 84    Temp 98.7 °F (37.1 °C)    Resp 20    Ht 5' 10\" (1.778 m)    Wt 181 lb 6.4 oz (82.3 kg)    SpO2 97%    BMI 26.03 kg/m2     General appearance - alert, well appearing, and in no distress  Mental status - oriented to person, place, and time  Mouth - mucous membranes moist, pharynx normal without lesions  Neck - supple  Chest - clear to auscultation, no wheezes, rales or rhonchi, symmetric air entry  Heart - normal rate, regular rhythm, normal S1, S2, no murmurs, rubs, clicks or gallops  Abdomen -soft, nontender, nondistended,bowel sounds present  Neurological - normal speech, no focal findings or movement disorder noted  Musculoskeletal - no joint tenderness, deformity or swelling, equal strength in upper and lower extremities  Extremities - peripheral pulses normal, pitting pedal edema bilaterally, no clubbing or cyanosis  Skin - normal coloration and turgor, no rashes, no suspicious skin lesions noted    LABS  Lab Results   Component Value Date/Time    WBC 4.8 07/13/2018 01:08 PM    HGB 10.1 (L) 07/13/2018 01:08 PM    HCT 32.0 (L) 07/13/2018 01:08 PM    PLATELET 899 80/24/2308 01:08 PM    MCV 94.1 07/13/2018 01:08 PM    ABS. NEUTROPHILS 3.2 07/13/2018 01:08 PM     Lab Results   Component Value Date/Time    Sodium 140 06/15/2018 01:16 PM    Potassium 3.9 06/15/2018 01:16 PM    Chloride 107 06/15/2018 01:16 PM    CO2 28 06/15/2018 01:16 PM    Glucose 97 06/15/2018 01:16 PM    BUN 15 06/15/2018 01:16 PM    Creatinine 0.53 (L) 06/15/2018 01:16 PM    GFR est AA >60 06/15/2018 01:16 PM    GFR est non-AA >60 06/15/2018 01:16 PM    Calcium 8.7 06/15/2018 01:16 PM     Lab Results   Component Value Date/Time    AST (SGOT) 24 06/15/2018 01:16 PM    Alk. phosphatase 116 06/15/2018 01:16 PM    Protein, total 7.4 06/15/2018 01:16 PM    Albumin 3.4 (L) 06/15/2018 01:16 PM    Globulin 4.0 06/15/2018 01:16 PM    A-G Ratio 0.9 (L) 06/15/2018 01:16 PM       PATHOLOGY  Small bowel resection on 7/21/15 ([id number]) with a 2.5cm well differentiated neuroendocrine cancer (carcinoid tumor) with 4 of 8 nodes positive. Margins negative. Pathologic stage T3N1. On IHC the cells were positive for synaptophysin, chromogranin, focally positive for pancytokeratin and CK 20. IMAGING    CT abdomen on 7/2/15 with stellate shaped mass seen in the right lower quadrant in the mesentery series 2 image 56 measuring 2.7 x   1.5 x 1.7 cm. There are fingerlike projections extending from this lesion to the small bowel which appears to be tethered. Questionable wall thickening seen in the loop of small bowel in the right lower quadrant. Multiple small nodules/lymph nodes seen in the mesentery, many of which are surrounding the stellate shaped mass. CT and MRI March 2017 was stable per outside reports    ASSESSMENT  Ms. Debbie Zhou is a 66 y.o. 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 DISCUSSION/PLAN  1. Neuroendocrine cancer. Doing well on Lanrotide. Underwent treatment with PRRT in clinical trial in [facility]. Tylna 149 in May- completed Oct 2017  Due to progression with symptoms underwent bland embolization of liver. After initial improvement in carcinoid symptoms- symptoms worsened within 3 months. Underwent repeat embolization with Dr. Julian Marie 7/2/2018. Significant improvement in symptoms at this time-minimal hot flashes, diarrhea better controlled and no presyncopal episodes. Planning another bland embolization in September, then another by end of year. Monitor labs monthly, chromogranin every 3 months. Continue Lanreotide as ordered. 2. Superior Mesenteric Thrombosis. Continue aspirin. 3. Diarrhea. Continue Xermelo TID dosing.  Lomotil use PRN, especially with travel/activity. Rescue Octreotide now once daily. 4. Periods of hypotension/presyncopal. Octreotide subcutaneous injection PRN. 5. Lower extremity edema. May ECHO with slight drop in EF of 55% from 60-65% in Sept 2017. Also showing some mitral regurgitation. Due to risk of neuroendocrine cancer affecting the heart and new onset pitting edema will repeat ECHO and make referral to cardiology. Follow up in 2 months, or sooner if needed.      Patient was seen with Nii Garnett MD

## 2018-07-13 NOTE — PROGRESS NOTES
Outpatient Infusion Center Progress Note    1300 Pt admit to [facility] for labs/Lanreotide injection. Pt ambulatory in stable condition. Assessment completed. No new concerns voiced. Labs drawn peripherally and sent for processing. Recent Results (from the past 12 hour(s))   CBC WITH AUTOMATED DIFF    Collection Time: 07/13/18  1:08 PM   Result Value Ref Range    WBC 4.8 3.6 - 11.0 K/uL    RBC 3.40 (L) 3.80 - 5.20 M/uL    HGB 10.1 (L) 11.5 - 16.0 g/dL    HCT 32.0 (L) 35.0 - 47.0 %    MCV 94.1 80.0 - 99.0 FL    MCH 29.7 26.0 - 34.0 PG    MCHC 31.6 30.0 - 36.5 g/dL    RDW 13.3 11.5 - 14.5 %    PLATELET 641 916 - 771 K/uL    MPV 9.9 8.9 - 12.9 FL    NRBC 0.0 0  WBC    ABSOLUTE NRBC 0.00 0.00 - 0.01 K/uL    NEUTROPHILS 67 32 - 75 %    LYMPHOCYTES 21 12 - 49 %    MONOCYTES 9 5 - 13 %    EOSINOPHILS 3 0 - 7 %    BASOPHILS 1 0 - 1 %    IMMATURE GRANULOCYTES 1 (H) 0.0 - 0.5 %    ABS. NEUTROPHILS 3.2 1.8 - 8.0 K/UL    ABS. LYMPHOCYTES 1.0 0.8 - 3.5 K/UL    ABS. MONOCYTES 0.4 0.0 - 1.0 K/UL    ABS. EOSINOPHILS 0.1 0.0 - 0.4 K/UL    ABS. BASOPHILS 0.0 0.0 - 0.1 K/UL    ABS. IMM. GRANS. 0.0 0.00 - 0.04 K/UL    DF AUTOMATED     METABOLIC PANEL, COMPREHENSIVE    Collection Time: 07/13/18  1:08 PM   Result Value Ref Range    Sodium 140 136 - 145 mmol/L    Potassium 4.0 3.5 - 5.1 mmol/L    Chloride 102 97 - 108 mmol/L    CO2 28 21 - 32 mmol/L    Anion gap 10 5 - 15 mmol/L    Glucose 238 (H) 65 - 100 mg/dL    BUN 15 6 - 20 MG/DL    Creatinine 0.78 0.55 - 1.02 MG/DL    BUN/Creatinine ratio 19 12 - 20      GFR est AA >60 >60 ml/min/1.73m2    GFR est non-AA >60 >60 ml/min/1.73m2    Calcium 8.4 (L) 8.5 - 10.1 MG/DL    Bilirubin, total 0.4 0.2 - 1.0 MG/DL    ALT (SGPT) 42 12 - 78 U/L    AST (SGOT) 34 15 - 37 U/L    Alk.  phosphatase 224 (H) 45 - 117 U/L    Protein, total 6.9 6.4 - 8.2 g/dL    Albumin 2.8 (L) 3.5 - 5.0 g/dL    Globulin 4.1 (H) 2.0 - 4.0 g/dL    A-G Ratio 0.7 (L) 1.1 - 2.2         Medications:  Lanreotide-SQright hip    1320 Pt tolerated treatment well. D/c home ambulatory in no distress. Pt aware of next appointment scheduled for 08/10/18 at 1300.

## 2018-08-10 NOTE — PROGRESS NOTES
[facility] Outpatient Infusion Center Note:  1300Pt arrived at [facility] ambulatory and in no distress for hormonal onjection. And labs   Assessment stable, no new complaints voiced. Pt just had echo for HDEZ. Medications received:  lanrectide in left glut    1340 Tolerated treatment well, no adverse reaction noted. D/Cd from [facility] ambulatory and in no distress accompanied by self. Next appt 9/7  0800  Visit Vitals    /88    Pulse 80    Temp 98 °F (36.7 °C)    Resp 18    Wt 77.2 kg (170 lb 3.2 oz)    BMI 24.42 kg/m2     No results found for this or any previous visit (from the past 12 hour(s)).

## 2018-08-16 NOTE — MR AVS SNAPSHOT
[address] [facility] [place] [address] 
[phone] Patient: Rufino Baldwin MRN: [id number] VTY:[id number] Visit Information Date & Time Provider Department Dept. Phone Encounter #  
 8/16/2018  3:40 PM Brian Wong MD [facility] [phone] [id number] Your Appointments 9/7/2018  8:15 AM  
ESTABLISHED PATIENT with Xavi Hernandez MD  
[address] [place] [address] Oncology at [facility] Appt Note: follow up infusion; follow up infusion, okay per [facility] - [address] [address]  
[phone]  
  
   
 [address] [address] [facility] [address] Upcoming Health Maintenance Date Due Hepatitis C Screening 1960 DTaP/Tdap/Td series (1 - Tdap) 3/24/1983 PAP AKA CERVICAL CYTOLOGY 3/24/1983 FOBT Q 1 YEAR AGE 50-75 3/24/2012 Pneumococcal 19-64 Highest Risk (2 of 3 - PCV13) 7/14/2017 Influenza Age 5 to Adult 8/1/2018 BREAST CANCER SCRN MAMMOGRAM 3/8/2020 Allergies as of 8/16/2018  Review Complete On: 8/10/2018 By: Momo Almanza RN Severity Noted Reaction Type Reactions Epinephrine  09/08/2017    Unknown (comments) Current Immunizations  Reviewed on 8/10/2018 Name Date Influenza Vaccine 12/20/2016 12:00 AM, 10/1/2015, 10/6/2014 12:00 AM, 9/26/2013 12:00 AM  
 Influenza Vaccine PF 10/16/2015 12:00 AM  
  
 Not reviewed this visit You Were Diagnosed With   
  
 Codes Comments SOB (shortness of breath)    -  Primary ICD-10-CM: R06.02 
ICD-9-CM: 786.05 Palpitations     ICD-10-CM: R00.2 ICD-9-CM: 785.1 Edema, unspecified type     ICD-10-CM: R60.9 ICD-9-CM: 663. 3 Vitals BP Pulse Height(growth percentile) Weight(growth percentile) BMI OB Status 122/80 (BP 1 Location: Right arm, BP Patient Position: Sitting) 81 5' 10\" (1.778 m) 176 lb (79.8 kg) 25.25 kg/m2 Menopause Smoking Status Never Smoker Vitals History BMI and BSA Data Body Mass Index Body Surface Area  
 25.25 kg/m 2 1.99 m 2 Preferred Pharmacy Pharmacy Name Phone [id number] [address] [phone] Your Updated Medication List  
  
   
This list is accurate as of 8/16/18  4:34 PM.  Always use your most recent med list.  
  
  
  
  
 CREON 36,000-114,000- 180,000 unit Cpdr  
Generic drug:  lipase-protease-amylase Take 1 Cap by mouth three (3) times daily. hydroCHLOROthiazide 12.5 mg tablet Commonly known as:  HYDRODIURIL Take 1 Tab by mouth daily. lanreotide 120 mg/0.5 mL injection syringe Commonly known as:  SOMATULINE DEPOT  
120 mg by SubCUTAneous route every twenty-eight (28) days. MULTIVITAMIN PO Take  by mouth daily. octreotide acetate 100 mcg/mL (1 mL) Syrg injection Commonly known as:  SANDOSTATIN INJECT 1 SYRINGE SUBCUTANEOUSLY TWICE A DAY PRESERVISION AREDS 2 PO Take  by mouth. PROBIOTIC 4X 10-15 mg Tbec Generic drug:  B.infantis-B.ani-B.long-B.bifi Take  by mouth. XERMELO 250 mg Tab Generic drug:  telotristat ethyl Take 250 mg by mouth three (3) times daily. Indications: carcinoid syndrome diarrhea We Performed the Following 2D ECHO COMPLETE ADULT (TTE) W OR WO CONTR [04859 CPT(R)] AMB POC EKG ROUTINE W/ 12 LEADS, INTER & REP [02779 CPT(R)] BNP [id number] CPT(R)] To-Do List   
 09/07/2018 8:00 AM  
  Appointment with [address] [place] [address] ([phone]) Patient Instructions Obtain BNP today. Schedule follow up with Dr. Diony Hawkins and echocardiogram (same day) in 6 months. Introducing [facility]! Dear Good Kelley: 
Thank you for requesting a [employer] account. Our records indicate that you already have an active [employer] account.   You can access your account anytime at [email or web address] Did you know that you can access your hospital and ER discharge instructions at any time in [facility]? You can also review all of your test results from your hospital stay or ER visit. Additional Information If you have questions, please visit the Frequently Asked Questions section of the [facility] website at [email or web address] Remember, [facility] is NOT to be used for urgent needs. For medical emergencies, dial 911. Now available from your iPhone and Android! Please provide this summary of care documentation to your next provider. Your primary care clinician is listed as CHAUNCEY Lopez . If you have any questions after today's visit, please call [phone].

## 2018-08-16 NOTE — PATIENT INSTRUCTIONS
Obtain BNP today. Schedule follow up with Dr. Gaudencio Choudhary and echocardiogram (same day) in 6 months.

## 2018-08-16 NOTE — PROGRESS NOTES
Caryl Montero     1960       Jasson Sousa MD, [facility] [place]  Date of Visit-8/16/2018   PCP is CHAUNCEY Carbajal MD   [facility] [place]  [facility]  HPI:  Caryl Montero is a 66 y.o. female   Has a neuroendocrine tumor. Has had 4 echo's but one in May had a possible drop in reported mentioned EF and  Pt had repeat on 8/10/18 which showed stable EF. She has had some edema. Overall the pt states she is doing well. Pt states that she has not had any chemotherapy. Pt states she has SOB when under heat or exertion and she tries to avoid steps. Pt states that her symptoms  includes indigestion and pain around middle chest area from the onset of tumor. Pt also had diarrhea and elevated bp (>110 when having pain episodes and up to 150 when exerting pressure). Pt states that she does not get swelling when wearing compression socks and does get swelling without those socks. Pt denies sense of arrhythmia when at rest.  Family Hx  Pt states that both of her parents has had family hx of high bp. Pt does run regularly. Denies chest pain, edema, syncope or shortness of breath at rest, has no tachycardia, palpitations or sense of arrhythmia. EKG: sinus within normal limit    Hx neuroendocrine tumor os small bowel, seen in [place], hypervascular liver lesions with embolization in July  For edema and HTN she is on HCTZ, was taking ACe-HCT combo pill previosly  Excellent historian,  here with her  Assessment/Plan:      Her EF is likely similar on these studies but I will review on all four echocardiograms and quantify to see if there's any change or if there's any portal HTN. A BNP will be helpful I'll call result in one week   Also we will follow up in 6 mo with echocardiogram .    Her shortness of breath and edema are the reason for the previous studies.   Carcinoid heart disease will be specifically reviewed on the echo , looking for fibrous areas on the valves, this is more common on right sided valves in carcinoid heart disease. There is no mention of previous significant TR. NT-pro BNP can be used prognostically for carcinoid heart disease also. Future Appointments  Date Time Provider Meagan Callahan   9/7/2018 8:00 AM [facility] N[facility]SE 4 [facility]   9/7/2018 8:15 AM Salvador Rodriguez MD [id number]   2/25/2019 8:00 AM CHERY RECINOS [facility] SCHED   2/25/2019 8:40 AM Jabier Young  [address]      Patient Instructions   Obtain BNP today. Schedule follow up with Dr. Suleiman Gutierrez and echocardiogram (same day) in 6 months. Impression:   1. SOB (shortness of breath)    2. Palpitations    3. Edema, unspecified type    4. Essential hypertension    5. Carcinoid syndrome ([address]    6. Neuroendocrine carcinoma of small bowel ([address]    7. Lesion of liver           Social Hx= non smoker, no alcohol  No IVDA  Review of Systems   Constitutional: Negative for diaphoresis, fever and malaise/fatigue. HENT: Negative for ear pain, hearing loss, nosebleeds and tinnitus. Eyes: Negative for blurred vision, double vision and pain. Respiratory: Positive for shortness of breath. Negative for cough, hemoptysis, sputum production, wheezing and stridor. Cardiovascular: Positive for leg swelling. Negative for chest pain, palpitations, orthopnea and claudication. Gastrointestinal: Positive for diarrhea. Negative for abdominal pain, blood in stool, constipation, heartburn, melena, nausea and vomiting. Genitourinary: Negative for dysuria, frequency and urgency. Musculoskeletal: Negative for back pain, falls, joint pain, myalgias and neck pain. Skin: Negative for rash. Neurological: Negative for dizziness, sensory change, seizures, loss of consciousness, weakness and headaches. Endo/Heme/Allergies: Does not bruise/bleed easily. Psychiatric/Behavioral: Negative for depression, hallucinations and memory loss.  The patient is not nervous/anxious and does not have insomnia. REVIEW OF SYMPTOMS: A  full 12 system ROS is reviewed with aid of new patient form  see scanned new patient worksheet. Allergies   Allergen Reactions    Epinephrine Unknown (comments)            see supplement sheet, initialed and to be scanned by staff  Past Medical History:   Diagnosis Date    Abdominal mass 7/2015    Carcinoid tumor     Gallstones     Hypertension     Menopause 2015    Neuroendocrine carcinoma of small bowel ([address] 8/3/2015      Social Hx= reports that she has never smoked. She has never used smokeless tobacco. She reports that she drinks about 0.6 oz of alcohol per week  She reports that she does not use illicit drugs. Exam and Labs:  /80 (BP 1 Location: Right arm, BP Patient Position: Sitting)  Pulse 81  Ht 5' 10\" (1.778 m)  Wt 176 lb (79.8 kg)  BMI 25.25 kg/m2     Constitutional:  NAD, comfortable  Head: NC,AT. Eyes: No scleral icterus. Neck:  Neck supple. No JVD present. Throat: moist mucous membranes. Chest: Effort normal & normal respiratory excursion . Neurological: alert, conversant and oriented . Skin: Skin is not cold. No obvious systemic rash noted. Not diaphoretic. No erythema. Psychiatric:  Grossly normal mood and affect. Behavior appears normal. Extremities:  no clubbing or cyanosis. Abdomen: non distended    Lungs:breath sounds normal. No stridor. distress, wheezes or  Rales. Heart: normal rate, regular rhythm, normal S1, S2, no murmurs, rubs, clicks or gallops , PMI non displaced. Edema: Edema is non-pitting 1-2+ mid shin.     Lab Results   Component Value Date/Time    Sodium 139 08/10/2018 01:24 PM    Potassium 4.4 08/10/2018 01:24 PM    Chloride 103 08/10/2018 01:24 PM    CO2 27 08/10/2018 01:24 PM    Anion gap 9 08/10/2018 01:24 PM    Glucose 81 08/10/2018 01:24 PM    BUN 12 08/10/2018 01:24 PM    Creatinine 0.53 (L) 08/10/2018 01:24 PM    BUN/Creatinine ratio 23 (H) 08/10/2018 01:24 PM GFR est AA >60 08/10/2018 01:24 PM    GFR est non-AA >60 08/10/2018 01:24 PM    Calcium 9.1 08/10/2018 01:24 PM      Wt Readings from Last 3 Encounters:   08/10/18 170 lb 3.2 oz (77.2 kg)   07/13/18 181 lb 6.4 oz (82.3 kg)   05/18/18 179 lb (81.2 kg)      BP Readings from Last 3 Encounters:   08/10/18 143/88   07/13/18 145/86   06/15/18 130/76      Current Outpatient Prescriptions   Medication Sig    octreotide acetate (SANDOSTATIN) 100 mcg/mL (1 mL) syrg injection INJECT 1 SYRINGE SUBCUTANEOUSLY TWICE A DAY    XERMELO 250 mg tab Take 250 mg by mouth three (3) times daily. Indications: carcinoid syndrome diarrhea    CREON 36,000-114,000- 180,000 unit cpDR Take 1 Cap by mouth three (3) times daily.  hydroCHLOROthiazide (HYDRODIURIL) 12.5 mg tablet Take 1 Tab by mouth daily.  VIT C/E/ZN/COPPR/LUTEIN/ZEAXAN (PRESERVISION AREDS 2 PO) Take  by mouth.  lanreotide (SOMATULINE DEPOT) 120 mg/0.5 mL injection syringe 120 mg by SubCUTAneous route every twenty-eight (28) days.  MULTIVITAMIN PO Take  by mouth daily. No current facility-administered medications for this visit. Impression see above.       Written by Jose Vega, as dictated by Arik Myles MD.

## 2018-08-17 NOTE — TELEPHONE ENCOUNTER
----- Message from Arik Myles MD sent at 8/17/2018  4:20 PM EDT -----  Let her know blood test is normal  We will contact her about previous echos next wee    Identifiers x 2. Informed of the above. Verbalized understanding.

## 2018-08-20 NOTE — TELEPHONE ENCOUNTER
----- Message from Cliff Cervantes MD sent at 8/17/2018  4:20 PM EDT -----  Let her know blood test is normal  We will contact her about previous echos next week

## 2018-09-07 NOTE — MR AVS SNAPSHOT
[address] 
[phone] Patient: Jer Innjoseph MRN: [id number] HGT:[id number] Visit Information Date & Time Provider Department Dept. Phone Encounter #  
 9/7/2018  8:15 AM Jong Calderon MD [address] [place] [address] Oncology at [facility] [phone] Follow-up Instructions Routing History Your Appointments 2/25/2019  8:00 AM  
ECHO CARDIOGRAMS 2D with ECHO, [facility] ([place] SCHEDULING) Appt Note: echo for edema 8:00 Dr. Elliot Ulloa 8:40 kmr [address] [place] [address]  
[address] 1000 [facility]  
  
    
 2/25/2019  8:40 AM  
ESTABLISHED PATIENT with Derrek Vázquez MD  
[facility] ([place] SCHEDULING) Appt Note: echo for edema 8:00 Dr. Elliot Ulloa 8:40 kmr [address] [place] [address]  
[address] [facility],[address] [phone] Upcoming Health Maintenance Date Due Hepatitis C Screening 1960 DTaP/Tdap/Td series (1 - Tdap) 3/24/1983 PAP AKA CERVICAL CYTOLOGY 3/24/1983 FOBT Q 1 YEAR AGE 50-75 3/24/2012 Pneumococcal 19-64 Highest Risk (2 of 3 - PCV13) 7/14/2017 Influenza Age 5 to Adult 8/1/2018 BREAST CANCER SCRN MAMMOGRAM 3/8/2020 Allergies as of 9/7/2018  Review Complete On: 9/7/2018 By: Carla Davis Severity Noted Reaction Type Reactions Epinephrine  09/08/2017    Unknown (comments) Current Immunizations  Reviewed on 8/10/2018 Name Date Influenza Vaccine 12/20/2016 12:00 AM, 10/1/2015, 10/6/2014 12:00 AM, 9/26/2013 12:00 AM  
 Influenza Vaccine PF 10/16/2015 12:00 AM  
  
 Not reviewed this visit Vitals BP Pulse Temp Resp Height(growth percentile) Weight(growth percentile) 131/84 78 98 °F (36.7 °C) 20 5' 10\" (1.778 m) 171 lb 12.8 oz (77.9 kg) SpO2 BMI OB Status Smoking Status 98% 24.65 kg/m2 Menopause Never Smoker Vitals History BMI and BSA Data Body Mass Index Body Surface Area  
 24.65 kg/m 2 1.96 m 2 Preferred Pharmacy Pharmacy Name Phone [id number] Britni HERNAN[phone] Your Updated Medication List  
  
   
This list is accurate as of 9/7/18  9:35 AM.  Always use your most recent med list.  
  
  
  
  
 CREON 36,000-114,000- 180,000 unit Cpdr  
Generic drug:  lipase-protease-amylase Take 1 Cap by mouth three (3) times daily. hydroCHLOROthiazide 12.5 mg tablet Commonly known as:  HYDRODIURIL Take 1 Tab by mouth daily. lanreotide 120 mg/0.5 mL injection syringe Commonly known as:  SOMATULINE DEPOT  
120 mg by SubCUTAneous route every twenty-eight (28) days. MULTIVITAMIN PO Take  by mouth daily. octreotide acetate 100 mcg/mL (1 mL) Syrg injection Commonly known as:  SANDOSTATIN INJECT 1 SYRINGE SUBCUTANEOUSLY TWICE A DAY PRESERVISION AREDS 2 PO Take  by mouth. PROBIOTIC 4X 10-15 mg Tbec Generic drug:  B.infantis-B.ani-B.long-B.bifi Take  by mouth. XERMELO 250 mg Tab Generic drug:  telotristat ethyl Take 250 mg by mouth three (3) times daily. Indications: carcinoid syndrome diarrhea To-Do List   
 10/05/2018 8:00 AM  
  Appointment with [address] [place] [address] ([phone]) Introducing [facility]! Dear Sherly [place]: 
Thank you for requesting a [employer] account. Our records indicate that you already have an active [employer] account. You can access your account anytime at [email or web address] Did you know that you can access your hospital and ER discharge instructions at any time in [employer]?   You can also review all of your test results from your hospital stay or ER visit. Additional Information If you have questions, please visit the Frequently Asked Questions section of the [employer] website at [email or web address] Remember, [employer] is NOT to be used for urgent needs. For medical emergencies, dial 911. Now available from your iPhone and Android! Please provide this summary of care documentation to your next provider. Your primary care clinician is listed as CHAUNCEY Shin. If you have any questions after today's visit, please call [phone].

## 2018-09-07 NOTE — PROGRESS NOTES
Hematology/Oncology Progress Note REASON FOR VISIT: Neuroendocrine cancer TREATMENT:  
Lanreotide started Jan 2016 Sutent in clinical trial at [facility] on 12/11/2016, stopped due to progression Started PRRT with Dr. Julian Marie that completed 4 treatments on 10/27/2017 HISTORY OF PRESENT ILLNESS: Ms. Debbie Zhou is a 66 y.o. 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 She had been having recurrent abdominal pain. She ultimately had a CT scan of her abdomen. While she had cholecystitis which was contributing to a lot of her symptoms she was found to have a mesenteric mass. She underwent cholecystectomy and resection of small bowel mass on 7/21/15. Intraoperatively it was noted that that there were tumor deposits in the mesentery and peritoneal surface. These deposits were not biopsied due to logistical reasons/access. She has seen Dr. Sherry Morrison at [address] [place] [address] who recommended surgery. Saw [facility] who recommended observation, but confirmed metastatic disease using DOTATE PET scan. She also saw Dr. Riley Reyna in [place], [place]. The imaging she had in [place] and [facility] confirmed disease in her liver. (MRI in [place] on 10/5/15 revealed multiple hypervascular liver lesions compatible with liver metastases.) Lanreotide was started on 1/29/16. Presents for follow-up while on Lanreotide injection. Underwent bland embolization of liver on 7/2/2018. She is scheduled for another on 9/26/18. She has had a thorough cardiac evaluation and no cardiac issues noted. She has noted that symptoms have started to come back- has flushing, diarrhea now is at 6-7 times a day, is tired. Occasional palpitations, no dizziness. Swelling much better and uses compression socks. Has lost some weight. Still on the Xermelo. Short acting octreotide twice a day now. BP has been controlled. No headache. Otherwise, ROS is per the symptom report form which has been scanned into the media section of the electronic medical record. Past Medical History:  
Diagnosis Date  Abdominal mass 7/2015  Carcinoid tumor  Gallstones  Hypertension  Menopause 2015  Neuroendocrine carcinoma of small bowel ([facility] [address] 8/3/2015 Past Surgical History:  
Procedure Laterality Date  HX ABDOMINAL LAPAROSCOPY  7/2015 Carcinoid tumor  HX CATARACT REMOVAL Bilateral   
 HX COLONOSCOPY  AUG 2012  HX GYN    
 vaginal DELIVERIES  x2  
 HX HEENT    
 LASIK  
 HX ORTHOPAEDIC Left 1979 RELOCATION PATELLA Allergies Allergen Reactions  Epinephrine Unknown (comments) Current Outpatient Prescriptions Medication Sig Dispense Refill  B.infantis-B.ani-B.long-B.bifi (PROBIOTIC 4X) 10-15 mg TbEC Take  by mouth.  octreotide acetate (SANDOSTATIN) 100 mcg/mL (1 mL) syrg injection INJECT 1 SYRINGE SUBCUTANEOUSLY TWICE A DAY 60 Syringe 2  XERMELO 250 mg tab Take 250 mg by mouth three (3) times daily. Indications: carcinoid syndrome diarrhea 90 Cap 3  
 CREON 36,000-114,000- 180,000 unit cpDR Take 1 Cap by mouth three (3) times daily. 270 Cap 3  
 hydroCHLOROthiazide (HYDRODIURIL) 12.5 mg tablet Take 1 Tab by mouth daily. 90 Tab 3  
 VIT C/E/ZN/COPPR/LUTEIN/ZEAXAN (PRESERVISION AREDS 2 PO) Take  by mouth.  lanreotide (SOMATULINE DEPOT) 120 mg/0.5 mL injection syringe 120 mg by SubCUTAneous route every twenty-eight (28) days.  MULTIVITAMIN PO Take  by mouth daily. Social History Social History  Marital status:  Spouse name: N/A  
 Number of children: N/A  
 Years of education: N/A Social History Main Topics  Smoking status: Never Smoker  Smokeless tobacco: Never Used  Alcohol use 0.6 oz/week 1 Glasses of wine per week Comment: RARE WINE  
 Drug use: No  
 Sexual activity: Yes  
  Partners: Male Other Topics Concern  None Social History Narrative Family History Problem Relation Age of Onset  Cancer Mother   
  unsure which type  Cancer Maternal Grandfather  Anesth Problems Neg Hx   
 
 
ROS As per the HPI, otherwise a comprehensive ROS is negative. ECOG PS is 0. Emotional well being addressed and patient is coping well. Physical Examination:  
Visit Vitals  /84  Pulse 78  Temp 98 °F (36.7 °C)  Resp 20  
 Ht 5' 10\" (1.778 m)  Wt 171 lb 12.8 oz (77.9 kg)  SpO2 98%  BMI 24.65 kg/m2 General appearance - alert, well appearing, and in no distress Mental status - oriented to person, place, and time Mouth - mucous membranes moist, pharynx normal without lesions Neck - supple Chest - clear to auscultation, no wheezes, rales or rhonchi, symmetric air entry Heart - normal rate, regular rhythm, normal S1, S2, no murmurs, rubs, clicks or gallops Abdomen -soft, nontender, nondistended,bowel sounds present Neurological - normal speech, no focal findings or movement disorder noted Musculoskeletal - no joint tenderness, deformity or swelling, equal strength in upper and lower extremities Extremities - peripheral pulses normal, pitting pedal edema markedly improved, no clubbing or cyanosis Skin - normal coloration and turgor, no rashes, no suspicious skin lesions noted LABS Lab Results Component Value Date/Time WBC 3.8 09/07/2018 08:20 AM  
 HGB 11.8 09/07/2018 08:20 AM  
 HCT 35.8 09/07/2018 08:20 AM  
 PLATELET 788 05/57/0723 08:20 AM  
 MCV 91.1 09/07/2018 08:20 AM  
 ABS. NEUTROPHILS 2.4 09/07/2018 08:20 AM  
 
Lab Results Component Value Date/Time  Sodium 140 09/07/2018 08:20 AM  
 Potassium 3.8 09/07/2018 08:20 AM  
 Chloride 105 09/07/2018 08:20 AM  
 CO2 26 09/07/2018 08:20 AM  
 Glucose 132 (H) 09/07/2018 08:20 AM  
 BUN 16 09/07/2018 08:20 AM  
 Creatinine 0.65 09/07/2018 08:20 AM  
 GFR est AA >60 09/07/2018 08:20 AM  
 GFR est non-AA >60 09/07/2018 08:20 AM  
 Calcium 9.2 09/07/2018 08:20 AM  
 
Lab Results Component Value Date/Time AST (SGOT) 26 09/07/2018 08:20 AM  
 Alk. phosphatase 129 (H) 09/07/2018 08:20 AM  
 Protein, total 7.4 09/07/2018 08:20 AM  
 Albumin 3.4 (L) 09/07/2018 08:20 AM  
 Globulin 4.0 09/07/2018 08:20 AM  
 A-G Ratio 0.9 (L) 09/07/2018 08:20 AM  
 
Chromogranin A Recent Labs  
   06/15/18 
 1316  03/23/18 
 1304 CHRGLT  108*  61* PATHOLOGY Small bowel resection on 7/21/15 ([id number]) with a 2.5cm well differentiated neuroendocrine cancer (carcinoid tumor) with 4 of 8 nodes positive. Margins negative. Pathologic stage T3N1. On IHC the cells were positive for synaptophysin, chromogranin, focally positive for pancytokeratin and CK 20. IMAGING 
 
CT abdomen on 7/2/15 with stellate shaped mass seen in the right lower quadrant in the mesentery series 2 image 56 measuring 2.7 x  
1.5 x 1.7 cm. There are fingerlike projections extending from this lesion to the small bowel which appears to be tethered. Questionable wall thickening seen in the loop of small bowel in the right lower quadrant. Multiple small nodules/lymph nodes seen in the mesentery, many of which are surrounding the stellate shaped mass. CT and MRI March 2017 was stable per outside reports ASSESSMENT Ms. Genie Mathew is a 66 y.o. 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 DISCUSSION/PLAN 1. Neuroendocrine cancer. On Lanrotide. Underwent treatment with PRRT in clinical trial in [place], . [address] in May- completed Oct 2017 Due to progression with symptoms underwent bland embolization of liver. After initial improvement in carcinoid symptoms- symptoms worsened within 3 months. Underwent repeat embolization with Dr. Ad Méndez 7/2/2018. Now with recurrent symptoms of carcinoid such as flushing, increasing diarrhea. She is scheduled for another bland embolization on 9/26/18 in [place]. In the meantime she will continue to use rescue octreotide.   I counseled her that she can use it up to every 8 hours as needed. Monitor labs monthly, chromogranin every 3 months. Continue Lanreotide as ordered. 2. Superior Mesenteric Thrombosis. Continue aspirin. 3. Diarrhea. Continue Xermelo TID dosing. Lomotil use PRN, especially with travel/activity. Rescue Octreotide -go up to every 8 hours as needed. 4. Periods of hypotension/presyncopal.  This has not recurred 5. Lower extremity edema. This has improved. She had a thorough cardiac evaluation and there does not appear to be any signs of carcinoid involving her bowels. Follow up in 2 months, or sooner if needed.   
 
 
 
Michael Griffin MD

## 2018-09-07 NOTE — PROGRESS NOTES
[facility] VISIT NOTE 
 
4312 Pt arrived at [facility] ambulatory and in no distress for Lanreotide injection. Assessment completed, no new complaints at this time. Peripheral labs drawn with no difficulty. See pending labs in CC. Medications received: 
Lanreotide deep SQ in right hip Blood pressure 140/86, pulse 81, temperature 97.8 °F (36.6 °C), resp. rate 18. Tolerated treatment well, no adverse reaction noted. [address] Joy[place] D/C'd from [facility] ambulatory and in no distress. Next appointment is 10/5/18 at 0800.

## 2018-10-05 NOTE — PROGRESS NOTES
[facility] VISIT NOTE 
 
9318 Pt arrived at [facility] ambulatory and in no distress for Lanreotide injection. Assessment completed, no new complaints at this time. Peripheral labs drawn with no difficulty. See pending labs in CC. Medications received: 
Lanreotide deep SQ in left hip Blood pressure 131/78, pulse 95, temperature 97.6 °F (36.4 °C), resp. rate 18. Tolerated treatment well, no adverse reaction noted. 65 D/C'd from [facility] ambulatory and in no distress. Next appointment is 11/2/18 at 0800.

## 2018-11-06 NOTE — TELEPHONE ENCOUNTER
Requested Prescriptions     Pending Prescriptions Disp Refills    XERMELO 250 mg tab       Sig: Take 250 mg by mouth three (3) times daily.

## 2018-11-09 NOTE — PROGRESS NOTES
Hematology/Oncology Progress Note REASON FOR VISIT: Neuroendocrine cancer TREATMENT:  
Lanreotide started Jan 2016 Sutent in clinical trial at [facility] on 12/11/2016, stopped due to progression Started PRRT with Dr. Miki Branch that completed 4 treatments on 10/27/2017 HISTORY OF PRESENT ILLNESS: Ms. Barbara Mahmood is a 66 y.o. 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 She had been having recurrent abdominal pain. She ultimately had a CT scan of her abdomen. While she had cholecystitis which was contributing to a lot of her symptoms she was found to have a mesenteric mass. She underwent cholecystectomy and resection of small bowel mass on 7/21/15. Intraoperatively it was noted that that there were tumor deposits in the mesentery and peritoneal surface. These deposits were not biopsied due to logistical reasons/access. She has seen Dr. Evans Scott at [address] [place] [address] who recommended surgery. Saw [facility] who recommended observation, but confirmed metastatic disease using DOTATE PET scan. She also saw Dr. Noel Granados in [place], [place]. The imaging she had in [place] and [facility] confirmed disease in her liver. (MRI in [place] on 10/5/15 revealed multiple hypervascular liver lesions compatible with liver metastases.) Lanreotide was started on 1/29/16. Presents for follow-up while on Lanreotide injection. She is had another liver embolization on 9/26/18. Did not have as much relief of symptoms as she did the first embolization. On 12/12 she will return to [place] for debulking surgery. She continues to have flushing about 10 times a day. Short acting octreotide provides relief but only works for ~3 hours. Diarrhea continues and worse in the morning, about 8-10 episodes a day. Still on Xermelo. Has intermittent heart palpitations that she feels when she is flushed. Denies headaches or dizziness.  She still has swelling in her lower extremities but has started wearing compression stockings which has helped. Swelling improves with elevation. Has lost some weight. Otherwise, ROS is per the symptom report form which has been scanned into the media section of the electronic medical record. Past Medical History:  
Diagnosis Date  Abdominal mass 7/2015  Carcinoid tumor  Gallstones  Hypertension  Menopause 2015  Neuroendocrine carcinoma of small bowel ([address] 8/3/2015 Past Surgical History:  
Procedure Laterality Date  HX ABDOMINAL LAPAROSCOPY  7/2015 Carcinoid tumor  HX CATARACT REMOVAL Bilateral   
 HX COLONOSCOPY  AUG 2012  HX GYN    
 vaginal DELIVERIES  x2  
 HX HEENT    
 LASIK  
 HX ORTHOPAEDIC Left 1979 RELOCATION PATELLA Allergies Allergen Reactions  Epinephrine Unknown (comments) Current Outpatient Medications Medication Sig Dispense Refill  XERMELO 250 mg tab Take 250 mg by mouth three (3) times daily. 90 Tab 3  
 octreotide acetate (SANDOSTATIN) 100 mcg/mL (1 mL) syrg injection INJECT 1 SYRINGE SUBCUTANEOUSLY TWICE DAILY 60 Syringe 1  
 CREON 36,000-114,000- 180,000 unit cpDR Take 1 Cap by mouth three (3) times daily. 270 Cap 3  
 B.infantis-B.ani-B.long-B.bifi (PROBIOTIC 4X) 10-15 mg TbEC Take  by mouth.  hydroCHLOROthiazide (HYDRODIURIL) 12.5 mg tablet Take 1 Tab by mouth daily. 90 Tab 3  
 VIT C/E/ZN/COPPR/LUTEIN/ZEAXAN (PRESERVISION AREDS 2 PO) Take  by mouth.  lanreotide (SOMATULINE DEPOT) 120 mg/0.5 mL injection syringe 120 mg by SubCUTAneous route every twenty-eight (28) days.  MULTIVITAMIN PO Take  by mouth daily. Social History Socioeconomic History  Marital status:  Spouse name: Not on file  Number of children: Not on file  Years of education: Not on file  Highest education level: Not on file Social Needs  Financial resource strain: Not on file  Food insecurity - worry: Not on file  Food insecurity - inability: Not on file  Transportation needs - medical: Not on file  Transportation needs - non-medical: Not on file Occupational History  Not on file Tobacco Use  Smoking status: Never Smoker  Smokeless tobacco: Never Used Substance and Sexual Activity  Alcohol use: Yes Alcohol/week: 0.6 oz Types: 1 Glasses of wine per week Comment: RARE WINE  
 Drug use: No  
 Sexual activity: Yes  
  Partners: Male Other Topics Concern  Not on file Social History Narrative  Not on file Family History Problem Relation Age of Onset  Cancer Mother   
     unsure which type  Cancer Maternal Grandfather  Anesth Problems Neg Hx   
 
 
ROS As per the HPI, otherwise a comprehensive ROS is negative. ECOG PS is 0. Emotional well being addressed and patient is coping well. Physical Examination:  
Visit Vitals /85 Pulse 87 Temp 98.1 °F (36.7 °C) Resp 16 Ht 5' 10\" (1.778 m) Wt 160 lb (72.6 kg) SpO2 97% BMI 22.96 kg/m² General appearance - alert, well appearing, and in no distress Mental status - oriented to person, place, and time Mouth - mucous membranes moist, pharynx normal without lesions Neck - supple Chest - clear to auscultation, no wheezes, rales or rhonchi, symmetric air entry Heart - normal rate, regular rhythm, normal S1, S2, no murmurs, rubs, clicks or gallops Musculoskeletal - no joint tenderness, deformity or swelling, equal strength in upper and lower extremities Extremities - peripheral pulses normal 
Skin - normal coloration and turgor, no rashes, no suspicious skin lesions noted LABS Lab Results Component Value Date/Time WBC 3.8 11/02/2018 08:10 AM  
 HGB 11.5 11/02/2018 08:10 AM  
 HCT 35.8 11/02/2018 08:10 AM  
 PLATELET 684 92/26/9003 08:10 AM  
 MCV 91.3 11/02/2018 08:10 AM  
 ABS. NEUTROPHILS 2.5 11/02/2018 08:10 AM  
 
Lab Results Component Value Date/Time Sodium 140 11/02/2018 08:10 AM  
 Potassium 4.4 11/02/2018 08:10 AM  
 Chloride 103 11/02/2018 08:10 AM  
 CO2 30 11/02/2018 08:10 AM  
 Glucose 176 (H) 11/02/2018 08:10 AM  
 BUN 12 11/02/2018 08:10 AM  
 Creatinine 0.61 11/02/2018 08:10 AM  
 GFR est AA >60 11/02/2018 08:10 AM  
 GFR est non-AA >60 11/02/2018 08:10 AM  
 Calcium 9.3 11/02/2018 08:10 AM  
 
Lab Results Component Value Date/Time AST (SGOT) 26 11/02/2018 08:10 AM  
 Alk. phosphatase 140 (H) 11/02/2018 08:10 AM  
 Protein, total 7.4 11/02/2018 08:10 AM  
 Albumin 3.4 (L) 11/02/2018 08:10 AM  
 Globulin 4.0 11/02/2018 08:10 AM  
 A-G Ratio 0.9 (L) 11/02/2018 08:10 AM  
 
Chromogranin A Recent Labs  
  09/07/18 
0820 06/15/18 
1316 03/23/18 
1304 CHRGLT 76* 108* 61* PATHOLOGY Small bowel resection on 7/21/15 ([id number]) with a 2.5cm well differentiated neuroendocrine cancer (carcinoid tumor) with 4 of 8 nodes positive. Margins negative. Pathologic stage T3N1. On IHC the cells were positive for synaptophysin, chromogranin, focally positive for pancytokeratin and CK 20. IMAGING 
 
CT abdomen on 7/2/15 with stellate shaped mass seen in the right lower quadrant in the mesentery series 2 image 56 measuring 2.7 x  
1.5 x 1.7 cm. There are fingerlike projections extending from this lesion to the small bowel which appears to be tethered. Questionable wall thickening seen in the loop of small bowel in the right lower quadrant. Multiple small nodules/lymph nodes seen in the mesentery, many of which are surrounding the stellate shaped mass. CT and MRI March 2017 was stable per outside reports ASSESSMENT Ms. Karli Burroughs is a 66 y.o. 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 DISCUSSION/PLAN 1. Neuroendocrine cancer. On Lanrotide. Underwent treatment with PRRT in clinical trial in [place], [address] in May- completed Oct 2017 Due to progression with symptoms underwent bland embolization of liver. After initial improvement in carcinoid symptoms- symptoms worsened within 3 months. Underwent repeat embolization with Dr. Miki Branch 7/2/2018. Now with recurrent symptoms of carcinoid such as flushing, increasing diarrhea. She underwent another bland embolization on 9/26/18 in [place] which did not improve symptoms. She continues rescue octreotide. I counseled her that she can use it up to every 8 hours as needed. Has surgery planned on 12/20/18 in [place]. Monitor labs monthly, chromogranin every 3 months. Continue Lanreotide as ordered. Hopeful that surgical debulking will improve symptom control, if not we could consider [place] 2. Superior Mesenteric Thrombosis. Continue aspirin. 3. Diarrhea. Continue Xermelo TID dosing. Lomotil use PRN, especially with travel/activity. Rescue Octreotide -go up to every 8 hours as needed. 4. Periods of hypotension/presyncopal.  This has not recurred. 5. Lower extremity edema. This has improved. She had a thorough cardiac evaluation and there does not appear to be any signs of carcinoid involving her bowels. Follow up in 2 months, or sooner if needed.   
 
Sera Sanchez MD

## 2018-11-12 NOTE — TELEPHONE ENCOUNTER
[employer] called on be half of patient stating that the xermelo needs a prior authorization to fill the script.     # [phone]

## 2018-11-13 NOTE — TELEPHONE ENCOUNTER
Called [phone] to initiate a PA, has been done over the phone awaiting approveal or denial in 1-3 days

## 2018-11-19 NOTE — TELEPHONE ENCOUNTER
Liudmila Boudreaux, AMIRAH 42 minutes ago (9:45 AM)      PA was aspproved for xemelo 250mg tablets, pt medication was sent to pt on Friday 11/16/2018 (Routing comment)

## 2018-11-30 NOTE — PROGRESS NOTES
OPIC VISIT NOTE 
 
0800 Pt arrived at [facility] ambulatory and in no distress for Lanreotide injection. Assessment completed, no new complaints at this time. Peripheral labs drawn with no difficulty. See pending labs in CC. Medications received: 
Lanreotide deep SQ in left hip Blood pressure 132/86, pulse 82, temperature 98.2 °F (36.8 °C), resp. rate 18. Tolerated treatment well, no adverse reaction noted. [address] D/C'd from [facility] ambulatory and in no distress. Next appointment is 12/28/18 at 0800.

## 2018-12-25 RX ORDER — LANREOTIDE ACETATE 120 MG/.5ML
120 INJECTION SUBCUTANEOUS ONCE
Status: DISCONTINUED | OUTPATIENT
Start: 2018-12-28 | End: 2018-12-28

## 2018-12-28 ENCOUNTER — HOSPITAL ENCOUNTER (OUTPATIENT)
Dept: INFUSION THERAPY | Age: 58
End: 2018-12-28

## 2019-01-07 ENCOUNTER — TELEPHONE (OUTPATIENT)
Dept: CARDIOLOGY CLINIC | Age: 59
End: 2019-01-07

## 2019-01-07 ENCOUNTER — TELEPHONE (OUTPATIENT)
Dept: ONCOLOGY | Age: 59
End: 2019-01-07

## 2019-01-07 NOTE — TELEPHONE ENCOUNTER
Call returned to patients , expressed our offices condolences. Per , patient had tumor debulking surgery in [place] on 12/12, was in ICU for two days post op to manage low BP, had multiple complications following and passed on 12/18. Thanked for our offices concern and follow up.

## 2019-01-25 ENCOUNTER — APPOINTMENT (OUTPATIENT)
Dept: INFUSION THERAPY | Age: 59
End: 2019-01-25